# Patient Record
Sex: FEMALE | Race: WHITE | Employment: OTHER | URBAN - METROPOLITAN AREA
[De-identification: names, ages, dates, MRNs, and addresses within clinical notes are randomized per-mention and may not be internally consistent; named-entity substitution may affect disease eponyms.]

---

## 2022-12-05 ENCOUNTER — HOSPITAL ENCOUNTER (INPATIENT)
Age: 72
LOS: 4 days | Discharge: HOME HEALTH | End: 2022-12-09
Attending: STUDENT IN AN ORGANIZED HEALTH CARE EDUCATION/TRAINING PROGRAM | Admitting: STUDENT IN AN ORGANIZED HEALTH CARE EDUCATION/TRAINING PROGRAM
Payer: MEDICARE

## 2022-12-05 DIAGNOSIS — J16.0 COMMUNITY ACQUIRED PNEUMONIA DUE TO CHLAMYDIA SPECIES: Primary | ICD-10-CM

## 2022-12-05 LAB
ADENOVIRUS DETECTION BY PCR: NOT DETECTED
ANION GAP SERPL CALCULATED.3IONS-SCNC: 9 MMOL/L (ref 4–16)
ANISOCYTOSIS: ABNORMAL
APTT: 40.9 SECONDS (ref 25.1–37.1)
BACTERIA: NEGATIVE /HPF
BANDED NEUTROPHILS ABSOLUTE COUNT: 1.73 K/CU MM
BANDED NEUTROPHILS RELATIVE PERCENT: 12 % (ref 5–11)
BASOPHILS ABSOLUTE: 0.1 K/CU MM
BASOPHILS RELATIVE PERCENT: 1 % (ref 0–1)
BILIRUBIN URINE: NEGATIVE MG/DL
BLOOD, URINE: ABNORMAL
BORDETELLA PARAPERTUSSIS BY PCR: NOT DETECTED
BORDETELLA PERTUSSIS PCR: NOT DETECTED
BUN BLDV-MCNC: 18 MG/DL (ref 6–23)
CALCIUM SERPL-MCNC: 9 MG/DL (ref 8.3–10.6)
CHLAMYDOPHILA PNEUMONIA PCR: NOT DETECTED
CHLORIDE BLD-SCNC: 100 MMOL/L (ref 99–110)
CLARITY: CLEAR
CO2: 24 MMOL/L (ref 21–32)
COLOR: YELLOW
CORONAVIRUS 229E PCR: NOT DETECTED
CORONAVIRUS HKU1 PCR: NOT DETECTED
CORONAVIRUS NL63 PCR: NOT DETECTED
CORONAVIRUS OC43 PCR: NOT DETECTED
CREAT SERPL-MCNC: 1 MG/DL (ref 0.6–1.1)
DIFFERENTIAL TYPE: ABNORMAL
EKG ATRIAL RATE: 103 BPM
EKG DIAGNOSIS: NORMAL
EKG P AXIS: 61 DEGREES
EKG P-R INTERVAL: 170 MS
EKG Q-T INTERVAL: 322 MS
EKG QRS DURATION: 74 MS
EKG QTC CALCULATION (BAZETT): 421 MS
EKG R AXIS: -9 DEGREES
EKG T AXIS: 5 DEGREES
EKG VENTRICULAR RATE: 103 BPM
FERRITIN: 48 NG/ML (ref 15–150)
FOLATE: 9.8 NG/ML (ref 3.1–17.5)
GFR SERPL CREATININE-BSD FRML MDRD: 60 ML/MIN/1.73M2
GLUCOSE BLD-MCNC: 88 MG/DL (ref 70–99)
GLUCOSE, URINE: NEGATIVE MG/DL
HCT VFR BLD CALC: 31.7 % (ref 37–47)
HEMOGLOBIN: 9.4 GM/DL (ref 12.5–16)
HUMAN METAPNEUMOVIRUS PCR: NOT DETECTED
HYPOCHROMIA: ABNORMAL
INFLUENZA A BY PCR: NOT DETECTED
INFLUENZA A H1 (2009) PCR: NOT DETECTED
INFLUENZA A H1 PANDEMIC PCR: NOT DETECTED
INFLUENZA A H3 PCR: NOT DETECTED
INFLUENZA B BY PCR: NOT DETECTED
IRON: 14 UG/DL (ref 37–145)
KETONES, URINE: NEGATIVE MG/DL
LACTATE: 1.3 MMOL/L (ref 0.4–2)
LEGIONELLA URINARY AG: NEGATIVE
LEUKOCYTE ESTERASE, URINE: NEGATIVE
LYMPHOCYTES ABSOLUTE: 0.7 K/CU MM
LYMPHOCYTES RELATIVE PERCENT: 5 % (ref 24–44)
MCH RBC QN AUTO: 25.3 PG (ref 27–31)
MCHC RBC AUTO-ENTMCNC: 29.7 % (ref 32–36)
MCV RBC AUTO: 85.2 FL (ref 78–100)
MONOCYTES ABSOLUTE: 0.3 K/CU MM
MONOCYTES RELATIVE PERCENT: 2 % (ref 0–4)
MUCUS: ABNORMAL HPF
MYCOPLASMA PNEUMONIAE PCR: NOT DETECTED
NITRITE URINE, QUANTITATIVE: NEGATIVE
OVALOCYTES: ABNORMAL
PARAINFLUENZA 1 PCR: NOT DETECTED
PARAINFLUENZA 2 PCR: NOT DETECTED
PARAINFLUENZA 3 PCR: ABNORMAL
PARAINFLUENZA 4 PCR: NOT DETECTED
PCT TRANSFERRIN: 4 % (ref 10–44)
PDW BLD-RTO: 15.9 % (ref 11.7–14.9)
PH, URINE: 5.5 (ref 5–8)
PLATELET # BLD: 265 K/CU MM (ref 140–440)
PMV BLD AUTO: 10.6 FL (ref 7.5–11.1)
POTASSIUM SERPL-SCNC: 4.4 MMOL/L (ref 3.5–5.1)
PROCALCITONIN: 12.75
PROTEIN UA: ABNORMAL MG/DL
RBC # BLD: 3.72 M/CU MM (ref 4.2–5.4)
RBC URINE: 2 /HPF (ref 0–6)
RETICULOCYTE COUNT PCT: 1.5 % (ref 0.2–2.2)
RHINOVIRUS ENTEROVIRUS PCR: NOT DETECTED
RSV PCR: NOT DETECTED
SARS-COV-2: NOT DETECTED
SEGMENTED NEUTROPHILS ABSOLUTE COUNT: 11.6 K/CU MM
SEGMENTED NEUTROPHILS RELATIVE PERCENT: 80 % (ref 36–66)
SODIUM BLD-SCNC: 133 MMOL/L (ref 135–145)
SPECIFIC GRAVITY UA: 1.02 (ref 1–1.03)
SQUAMOUS EPITHELIAL: <1 /HPF
STOMATOCYTES: ABNORMAL
STREP PNEUMONIAE ANTIGEN: NORMAL
TOTAL IRON BINDING CAPACITY: 333 UG/DL (ref 250–450)
TRICHOMONAS: ABNORMAL /HPF
TROPONIN T: 0.13 NG/ML
UNSATURATED IRON BINDING CAPACITY: 319 UG/DL (ref 110–370)
UROBILINOGEN, URINE: 0.2 MG/DL (ref 0.2–1)
VITAMIN B-12: 764.1 PG/ML (ref 211–911)
WBC # BLD: 14.4 K/CU MM (ref 4–10.5)
WBC UA: 1 /HPF (ref 0–5)

## 2022-12-05 PROCEDURE — 85027 COMPLETE CBC AUTOMATED: CPT

## 2022-12-05 PROCEDURE — C9113 INJ PANTOPRAZOLE SODIUM, VIA: HCPCS | Performed by: STUDENT IN AN ORGANIZED HEALTH CARE EDUCATION/TRAINING PROGRAM

## 2022-12-05 PROCEDURE — 6370000000 HC RX 637 (ALT 250 FOR IP): Performed by: STUDENT IN AN ORGANIZED HEALTH CARE EDUCATION/TRAINING PROGRAM

## 2022-12-05 PROCEDURE — 87081 CULTURE SCREEN ONLY: CPT

## 2022-12-05 PROCEDURE — 6360000002 HC RX W HCPCS: Performed by: STUDENT IN AN ORGANIZED HEALTH CARE EDUCATION/TRAINING PROGRAM

## 2022-12-05 PROCEDURE — 93005 ELECTROCARDIOGRAM TRACING: CPT | Performed by: STUDENT IN AN ORGANIZED HEALTH CARE EDUCATION/TRAINING PROGRAM

## 2022-12-05 PROCEDURE — 82728 ASSAY OF FERRITIN: CPT

## 2022-12-05 PROCEDURE — 2060000000 HC ICU INTERMEDIATE R&B

## 2022-12-05 PROCEDURE — 36415 COLL VENOUS BLD VENIPUNCTURE: CPT

## 2022-12-05 PROCEDURE — 2700000000 HC OXYGEN THERAPY PER DAY

## 2022-12-05 PROCEDURE — 81001 URINALYSIS AUTO W/SCOPE: CPT

## 2022-12-05 PROCEDURE — 82746 ASSAY OF FOLIC ACID SERUM: CPT

## 2022-12-05 PROCEDURE — 87040 BLOOD CULTURE FOR BACTERIA: CPT

## 2022-12-05 PROCEDURE — 94640 AIRWAY INHALATION TREATMENT: CPT

## 2022-12-05 PROCEDURE — 2580000003 HC RX 258: Performed by: STUDENT IN AN ORGANIZED HEALTH CARE EDUCATION/TRAINING PROGRAM

## 2022-12-05 PROCEDURE — 87449 NOS EACH ORGANISM AG IA: CPT

## 2022-12-05 PROCEDURE — 83540 ASSAY OF IRON: CPT

## 2022-12-05 PROCEDURE — 87899 AGENT NOS ASSAY W/OPTIC: CPT

## 2022-12-05 PROCEDURE — 82803 BLOOD GASES ANY COMBINATION: CPT

## 2022-12-05 PROCEDURE — 84484 ASSAY OF TROPONIN QUANT: CPT

## 2022-12-05 PROCEDURE — 93010 ELECTROCARDIOGRAM REPORT: CPT | Performed by: INTERNAL MEDICINE

## 2022-12-05 PROCEDURE — 94761 N-INVAS EAR/PLS OXIMETRY MLT: CPT

## 2022-12-05 PROCEDURE — 0202U NFCT DS 22 TRGT SARS-COV-2: CPT

## 2022-12-05 PROCEDURE — 83550 IRON BINDING TEST: CPT

## 2022-12-05 PROCEDURE — 84145 PROCALCITONIN (PCT): CPT

## 2022-12-05 PROCEDURE — 83605 ASSAY OF LACTIC ACID: CPT

## 2022-12-05 PROCEDURE — 85007 BL SMEAR W/DIFF WBC COUNT: CPT

## 2022-12-05 PROCEDURE — 94664 DEMO&/EVAL PT USE INHALER: CPT

## 2022-12-05 PROCEDURE — 85730 THROMBOPLASTIN TIME PARTIAL: CPT

## 2022-12-05 PROCEDURE — 80048 BASIC METABOLIC PNL TOTAL CA: CPT

## 2022-12-05 PROCEDURE — 85045 AUTOMATED RETICULOCYTE COUNT: CPT

## 2022-12-05 PROCEDURE — 82607 VITAMIN B-12: CPT

## 2022-12-05 PROCEDURE — 93306 TTE W/DOPPLER COMPLETE: CPT

## 2022-12-05 RX ORDER — SODIUM CHLORIDE 0.9 % (FLUSH) 0.9 %
5-40 SYRINGE (ML) INJECTION EVERY 12 HOURS SCHEDULED
Status: DISCONTINUED | OUTPATIENT
Start: 2022-12-05 | End: 2022-12-09 | Stop reason: HOSPADM

## 2022-12-05 RX ORDER — GUAIFENESIN/DEXTROMETHORPHAN 100-10MG/5
5 SYRUP ORAL EVERY 4 HOURS PRN
Status: DISCONTINUED | OUTPATIENT
Start: 2022-12-05 | End: 2022-12-09 | Stop reason: HOSPADM

## 2022-12-05 RX ORDER — IPRATROPIUM BROMIDE AND ALBUTEROL SULFATE 2.5; .5 MG/3ML; MG/3ML
1 SOLUTION RESPIRATORY (INHALATION) ONCE
Status: COMPLETED | OUTPATIENT
Start: 2022-12-05 | End: 2022-12-05

## 2022-12-05 RX ORDER — ALBUTEROL SULFATE 90 UG/1
2 AEROSOL, METERED RESPIRATORY (INHALATION)
Status: DISCONTINUED | OUTPATIENT
Start: 2022-12-05 | End: 2022-12-07

## 2022-12-05 RX ORDER — ALBUTEROL SULFATE 90 UG/1
2 AEROSOL, METERED RESPIRATORY (INHALATION) ONCE
Status: COMPLETED | OUTPATIENT
Start: 2022-12-05 | End: 2022-12-05

## 2022-12-05 RX ORDER — BENZONATATE 100 MG/1
100 CAPSULE ORAL 3 TIMES DAILY PRN
Status: DISCONTINUED | OUTPATIENT
Start: 2022-12-05 | End: 2022-12-09 | Stop reason: HOSPADM

## 2022-12-05 RX ORDER — ACETAMINOPHEN 325 MG/1
650 TABLET ORAL EVERY 6 HOURS PRN
Status: DISCONTINUED | OUTPATIENT
Start: 2022-12-05 | End: 2022-12-09 | Stop reason: HOSPADM

## 2022-12-05 RX ORDER — ONDANSETRON 4 MG/1
4 TABLET, ORALLY DISINTEGRATING ORAL EVERY 8 HOURS PRN
Status: DISCONTINUED | OUTPATIENT
Start: 2022-12-05 | End: 2022-12-09 | Stop reason: HOSPADM

## 2022-12-05 RX ORDER — SODIUM CHLORIDE 9 MG/ML
INJECTION, SOLUTION INTRAVENOUS CONTINUOUS
Status: DISCONTINUED | OUTPATIENT
Start: 2022-12-05 | End: 2022-12-08

## 2022-12-05 RX ORDER — SODIUM CHLORIDE 9 MG/ML
INJECTION, SOLUTION INTRAVENOUS PRN
Status: DISCONTINUED | OUTPATIENT
Start: 2022-12-05 | End: 2022-12-09 | Stop reason: HOSPADM

## 2022-12-05 RX ORDER — SODIUM CHLORIDE 0.9 % (FLUSH) 0.9 %
5-40 SYRINGE (ML) INJECTION PRN
Status: DISCONTINUED | OUTPATIENT
Start: 2022-12-05 | End: 2022-12-09 | Stop reason: HOSPADM

## 2022-12-05 RX ORDER — ACETAMINOPHEN 650 MG/1
650 SUPPOSITORY RECTAL EVERY 6 HOURS PRN
Status: DISCONTINUED | OUTPATIENT
Start: 2022-12-05 | End: 2022-12-09 | Stop reason: HOSPADM

## 2022-12-05 RX ORDER — PANTOPRAZOLE SODIUM 40 MG/10ML
40 INJECTION, POWDER, LYOPHILIZED, FOR SOLUTION INTRAVENOUS DAILY
Status: DISCONTINUED | OUTPATIENT
Start: 2022-12-05 | End: 2022-12-05 | Stop reason: CLARIF

## 2022-12-05 RX ORDER — IPRATROPIUM BROMIDE AND ALBUTEROL SULFATE 2.5; .5 MG/3ML; MG/3ML
1 SOLUTION RESPIRATORY (INHALATION)
Status: DISCONTINUED | OUTPATIENT
Start: 2022-12-05 | End: 2022-12-05

## 2022-12-05 RX ORDER — ENOXAPARIN SODIUM 100 MG/ML
40 INJECTION SUBCUTANEOUS EVERY EVENING
Status: DISCONTINUED | OUTPATIENT
Start: 2022-12-06 | End: 2022-12-09 | Stop reason: HOSPADM

## 2022-12-05 RX ORDER — SODIUM CHLORIDE, SODIUM LACTATE, POTASSIUM CHLORIDE, CALCIUM CHLORIDE 600; 310; 30; 20 MG/100ML; MG/100ML; MG/100ML; MG/100ML
INJECTION, SOLUTION INTRAVENOUS CONTINUOUS
Status: DISCONTINUED | OUTPATIENT
Start: 2022-12-05 | End: 2022-12-05

## 2022-12-05 RX ORDER — ONDANSETRON 2 MG/ML
4 INJECTION INTRAMUSCULAR; INTRAVENOUS EVERY 6 HOURS PRN
Status: DISCONTINUED | OUTPATIENT
Start: 2022-12-05 | End: 2022-12-09 | Stop reason: HOSPADM

## 2022-12-05 RX ORDER — GUAIFENESIN 600 MG/1
600 TABLET, EXTENDED RELEASE ORAL 2 TIMES DAILY
Status: DISCONTINUED | OUTPATIENT
Start: 2022-12-05 | End: 2022-12-09 | Stop reason: HOSPADM

## 2022-12-05 RX ORDER — LEVOFLOXACIN 5 MG/ML
750 INJECTION, SOLUTION INTRAVENOUS
Status: DISCONTINUED | OUTPATIENT
Start: 2022-12-06 | End: 2022-12-06

## 2022-12-05 RX ORDER — ASPIRIN 81 MG/1
81 TABLET, CHEWABLE ORAL DAILY
Status: DISCONTINUED | OUTPATIENT
Start: 2022-12-05 | End: 2022-12-09 | Stop reason: HOSPADM

## 2022-12-05 RX ADMIN — ALBUTEROL SULFATE 2 PUFF: 90 AEROSOL, METERED RESPIRATORY (INHALATION) at 11:59

## 2022-12-05 RX ADMIN — Medication 2 PUFF: at 19:46

## 2022-12-05 RX ADMIN — PIPERACILLIN AND TAZOBACTAM 3375 MG: 3; .375 INJECTION, POWDER, LYOPHILIZED, FOR SOLUTION INTRAVENOUS at 16:42

## 2022-12-05 RX ADMIN — GUAIFENESIN 600 MG: 600 TABLET, EXTENDED RELEASE ORAL at 08:47

## 2022-12-05 RX ADMIN — PIPERACILLIN AND TAZOBACTAM 3375 MG: 3; .375 INJECTION, POWDER, LYOPHILIZED, FOR SOLUTION INTRAVENOUS at 09:30

## 2022-12-05 RX ADMIN — ALBUTEROL SULFATE 2 PUFF: 90 AEROSOL, METERED RESPIRATORY (INHALATION) at 07:56

## 2022-12-05 RX ADMIN — SODIUM CHLORIDE, PRESERVATIVE FREE 40 MG: 5 INJECTION INTRAVENOUS at 08:48

## 2022-12-05 RX ADMIN — SODIUM CHLORIDE, PRESERVATIVE FREE 10 ML: 5 INJECTION INTRAVENOUS at 21:05

## 2022-12-05 RX ADMIN — Medication 2 PUFF: at 07:58

## 2022-12-05 RX ADMIN — SODIUM CHLORIDE: 9 INJECTION, SOLUTION INTRAVENOUS at 09:30

## 2022-12-05 RX ADMIN — ALBUTEROL SULFATE 2 PUFF: 90 AEROSOL, METERED RESPIRATORY (INHALATION) at 19:46

## 2022-12-05 RX ADMIN — ASPIRIN 81 MG: 81 TABLET, CHEWABLE ORAL at 08:47

## 2022-12-05 RX ADMIN — ACETAMINOPHEN 650 MG: 325 TABLET ORAL at 08:48

## 2022-12-05 RX ADMIN — GUAIFENESIN AND DEXTROMETHORPHAN 5 ML: 100; 10 SYRUP ORAL at 16:42

## 2022-12-05 RX ADMIN — GUAIFENESIN 600 MG: 600 TABLET, EXTENDED RELEASE ORAL at 21:05

## 2022-12-05 RX ADMIN — ACETAMINOPHEN 650 MG: 325 TABLET ORAL at 16:42

## 2022-12-05 RX ADMIN — Medication 2 PUFF: at 12:01

## 2022-12-05 RX ADMIN — IPRATROPIUM BROMIDE AND ALBUTEROL SULFATE 1 AMPULE: .5; 2.5 SOLUTION RESPIRATORY (INHALATION) at 08:19

## 2022-12-05 ASSESSMENT — PAIN DESCRIPTION - DESCRIPTORS: DESCRIPTORS: ACHING

## 2022-12-05 ASSESSMENT — PAIN DESCRIPTION - LOCATION: LOCATION: HEAD

## 2022-12-05 NOTE — PROGRESS NOTES
Upon completion of MDI treatments, patient had an increased work of breathing, with wheezing with moderated retractions. This RT notified [place]. This RT gave patient a Duoneb . Patient is still wheezing, retractions have improved. Patient placed on 6 LPM HFNC.

## 2022-12-05 NOTE — H&P
History and Physical      Name:  Nneka Ortiz /Age/Sex: 1950  (67 y.o. female)   MRN & CSN:  [id number] & [id number] Encounter Date/Time: 2022 6:03 AM EST   Location:  -A PCP: No primary care provider on file. Hospital Day: 1    Assessment and Plan:     Patient is a 68-year-old female who presented with SOB x5 days. # Sepsis and hypoxemic respiratory failure secondary to community-acquired pneumonia  - Presented with worsening SOB and productive cough of brown sputum x5 days. Also endorsed subjective fevers, nausea and NBNB vomiting. No travels or sick contacts. - At outside ED, had SpO2 in low 80's on arrival, required NRB. Febrile at 103 °F, labs showed leukocytosis of 17.5. LA 2.0. CXR showing left basilar consolidation. CTPE negative, but showed infiltrates in LLL and RML. Flu and COVID negative. - Received IVF and Levaquin in the ED, continue.  - Follow-up cultures and inflammatory markers. # NSTEMI, type II  - Denied any CP, but endorsed 4-pillow orthopnea orthopnea and HERNANDEZ. No cardiac hx. - At outside ED, initial Tn 0.123 then 0.374 (normal <0.013). ECG without acute ischemic changes. .  - Follow-up repeat Tn, consider Cardiology consult if continues to rise. TTE ordered. Telemetry. # Acute on chronic anemia  - Denied any bleeding. No NSAIDs.  - Hg 9.3 in the ED, down from 14 in 2022.   - Follow-up anemia panel. Started on PPI. # Essential hypertension  - Held home Norvasc 10 mg and Chlorthalidone 25 mg in setting of sepsis. Checklist:  Advanced directive: DNR/DNI and no CV//AA  Antibiotics: as above  Catheter: none  DVT ppx: Lovenox  Nutrition/Diet: cardiac    Disposition: patient requires continued admission due to sepsis secondary to community-acquired pneumonia. MDM: moderate.     History of Present Illness:     Chief Complaint: shortness of breath    Patient is a 68-year-old female with a PMHx of HTN who presented to the ED with BILITOT, ALKPHOS, AST, ALT in the last 72 hours. Lipids: No results found for: CHOL, HDL, TRIG  Hemoglobin A1C: No results found for: LABA1C  TSH: No results found for: TSH  Troponin: No results found for: TROPONINT  BNP: No results for input(s): PROBNP in the last 72 hours. Lactic Acid: No results for input(s): LACTA in the last 72 hours.   UA:No results found for: Selina Reg, PHUR, LABCAST, WBCUA, RBCUA, MUCUS, TRICHOMONAS, YEAST, BACTERIA, CLARITYU, SPECGRAV, LEUKOCYTESUR, UROBILINOGEN, BILIRUBINUR, BLOODU, GLUCOSEU, KETUA, AMORPHOUS  Urine Cultures: No results found for: LABURIN  Blood Cultures: No results found for: BC  No results found for: BLOODCULT2  Organism: No results found for: [facility]    Radiology results:  No orders to display       Medications:     Medications:    levofloxacin  750 mg IntraVENous Q24H    pantoprazole  40 mg IntraVENous Daily    aspirin  81 mg Oral Daily    sodium chloride flush  5-40 mL IntraVENous 2 times per day    [START ON 12/6/2022] enoxaparin  40 mg SubCUTAneous QPM    ipratropium-albuterol  1 ampule Inhalation Q4H WA    guaiFENesin  600 mg Oral BID        Infusions:    sodium chloride         PRN Meds:   sodium chloride flush, 5-40 mL, PRN  sodium chloride, , PRN  ondansetron, 4 mg, Q8H PRN   Or  ondansetron, 4 mg, Q6H PRN  acetaminophen, 650 mg, Q6H PRN   Or  acetaminophen, 650 mg, Q6H PRN      Estelita Hahn MD  12/05/22 6:03 AM

## 2022-12-06 LAB
ANION GAP SERPL CALCULATED.3IONS-SCNC: 10 MMOL/L (ref 4–16)
BASOPHILS ABSOLUTE: 0 K/CU MM
BASOPHILS RELATIVE PERCENT: 0.2 % (ref 0–1)
BUN BLDV-MCNC: 15 MG/DL (ref 6–23)
CALCIUM SERPL-MCNC: 8.6 MG/DL (ref 8.3–10.6)
CHLORIDE BLD-SCNC: 106 MMOL/L (ref 99–110)
CO2: 23 MMOL/L (ref 21–32)
CREAT SERPL-MCNC: 0.8 MG/DL (ref 0.6–1.1)
DIFFERENTIAL TYPE: ABNORMAL
EOSINOPHILS ABSOLUTE: 0 K/CU MM
EOSINOPHILS RELATIVE PERCENT: 0 % (ref 0–3)
GFR SERPL CREATININE-BSD FRML MDRD: >60 ML/MIN/1.73M2
GLUCOSE BLD-MCNC: 96 MG/DL (ref 70–99)
HCT VFR BLD CALC: 28.7 % (ref 37–47)
HEMOGLOBIN: 8.4 GM/DL (ref 12.5–16)
IMMATURE NEUTROPHIL %: 1 % (ref 0–0.43)
LYMPHOCYTES ABSOLUTE: 0.7 K/CU MM
LYMPHOCYTES RELATIVE PERCENT: 7.4 % (ref 24–44)
MCH RBC QN AUTO: 24.9 PG (ref 27–31)
MCHC RBC AUTO-ENTMCNC: 29.3 % (ref 32–36)
MCV RBC AUTO: 85.2 FL (ref 78–100)
MONOCYTES ABSOLUTE: 0.3 K/CU MM
MONOCYTES RELATIVE PERCENT: 2.9 % (ref 0–4)
NUCLEATED RBC %: 0.2 %
PDW BLD-RTO: 15.9 % (ref 11.7–14.9)
PLATELET # BLD: 213 K/CU MM (ref 140–440)
PMV BLD AUTO: 10.5 FL (ref 7.5–11.1)
POTASSIUM SERPL-SCNC: 4.2 MMOL/L (ref 3.5–5.1)
RBC # BLD: 3.37 M/CU MM (ref 4.2–5.4)
SEGMENTED NEUTROPHILS ABSOLUTE COUNT: 8.5 K/CU MM
SEGMENTED NEUTROPHILS RELATIVE PERCENT: 88.5 % (ref 36–66)
SODIUM BLD-SCNC: 139 MMOL/L (ref 135–145)
TOTAL IMMATURE NEUTOROPHIL: 0.1 K/CU MM
TOTAL NUCLEATED RBC: 0 K/CU MM
WBC # BLD: 9.6 K/CU MM (ref 4–10.5)

## 2022-12-06 PROCEDURE — 2060000000 HC ICU INTERMEDIATE R&B

## 2022-12-06 PROCEDURE — 6370000000 HC RX 637 (ALT 250 FOR IP): Performed by: STUDENT IN AN ORGANIZED HEALTH CARE EDUCATION/TRAINING PROGRAM

## 2022-12-06 PROCEDURE — 2580000003 HC RX 258: Performed by: STUDENT IN AN ORGANIZED HEALTH CARE EDUCATION/TRAINING PROGRAM

## 2022-12-06 PROCEDURE — 94761 N-INVAS EAR/PLS OXIMETRY MLT: CPT

## 2022-12-06 PROCEDURE — C9113 INJ PANTOPRAZOLE SODIUM, VIA: HCPCS | Performed by: STUDENT IN AN ORGANIZED HEALTH CARE EDUCATION/TRAINING PROGRAM

## 2022-12-06 PROCEDURE — 80048 BASIC METABOLIC PNL TOTAL CA: CPT

## 2022-12-06 PROCEDURE — 6360000002 HC RX W HCPCS: Performed by: STUDENT IN AN ORGANIZED HEALTH CARE EDUCATION/TRAINING PROGRAM

## 2022-12-06 PROCEDURE — 85025 COMPLETE CBC W/AUTO DIFF WBC: CPT

## 2022-12-06 PROCEDURE — 2700000000 HC OXYGEN THERAPY PER DAY

## 2022-12-06 PROCEDURE — 94640 AIRWAY INHALATION TREATMENT: CPT

## 2022-12-06 PROCEDURE — 36415 COLL VENOUS BLD VENIPUNCTURE: CPT

## 2022-12-06 RX ADMIN — ALBUTEROL SULFATE 2 PUFF: 90 AEROSOL, METERED RESPIRATORY (INHALATION) at 12:01

## 2022-12-06 RX ADMIN — Medication 2 PUFF: at 15:42

## 2022-12-06 RX ADMIN — BENZONATATE 100 MG: 100 CAPSULE ORAL at 08:41

## 2022-12-06 RX ADMIN — GUAIFENESIN 600 MG: 600 TABLET, EXTENDED RELEASE ORAL at 20:51

## 2022-12-06 RX ADMIN — ASPIRIN 81 MG: 81 TABLET, CHEWABLE ORAL at 08:41

## 2022-12-06 RX ADMIN — GUAIFENESIN AND DEXTROMETHORPHAN 5 ML: 100; 10 SYRUP ORAL at 13:41

## 2022-12-06 RX ADMIN — CEFTRIAXONE SODIUM 1000 MG: 1 INJECTION, POWDER, FOR SOLUTION INTRAMUSCULAR; INTRAVENOUS at 13:38

## 2022-12-06 RX ADMIN — DEXTROSE MONOHYDRATE 500 MG: 50 INJECTION, SOLUTION INTRAVENOUS at 13:38

## 2022-12-06 RX ADMIN — SODIUM CHLORIDE, PRESERVATIVE FREE 10 ML: 5 INJECTION INTRAVENOUS at 20:51

## 2022-12-06 RX ADMIN — SODIUM CHLORIDE, PRESERVATIVE FREE 40 MG: 5 INJECTION INTRAVENOUS at 08:42

## 2022-12-06 RX ADMIN — ALBUTEROL SULFATE 2 PUFF: 90 AEROSOL, METERED RESPIRATORY (INHALATION) at 23:53

## 2022-12-06 RX ADMIN — PIPERACILLIN AND TAZOBACTAM 3375 MG: 3; .375 INJECTION, POWDER, LYOPHILIZED, FOR SOLUTION INTRAVENOUS at 02:51

## 2022-12-06 RX ADMIN — ALBUTEROL SULFATE 2 PUFF: 90 AEROSOL, METERED RESPIRATORY (INHALATION) at 08:07

## 2022-12-06 RX ADMIN — PIPERACILLIN AND TAZOBACTAM 3375 MG: 3; .375 INJECTION, POWDER, LYOPHILIZED, FOR SOLUTION INTRAVENOUS at 08:43

## 2022-12-06 RX ADMIN — GUAIFENESIN AND DEXTROMETHORPHAN 5 ML: 100; 10 SYRUP ORAL at 02:48

## 2022-12-06 RX ADMIN — GUAIFENESIN 600 MG: 600 TABLET, EXTENDED RELEASE ORAL at 08:41

## 2022-12-06 RX ADMIN — Medication 2 PUFF: at 03:09

## 2022-12-06 RX ADMIN — Medication 2 PUFF: at 23:54

## 2022-12-06 RX ADMIN — Medication 2 PUFF: at 08:07

## 2022-12-06 RX ADMIN — ENOXAPARIN SODIUM 40 MG: 100 INJECTION SUBCUTANEOUS at 17:56

## 2022-12-06 RX ADMIN — Medication 2 PUFF: at 12:01

## 2022-12-06 RX ADMIN — ALBUTEROL SULFATE 2 PUFF: 90 AEROSOL, METERED RESPIRATORY (INHALATION) at 19:58

## 2022-12-06 RX ADMIN — ALBUTEROL SULFATE 2 PUFF: 90 AEROSOL, METERED RESPIRATORY (INHALATION) at 15:42

## 2022-12-06 RX ADMIN — ALBUTEROL SULFATE 2 PUFF: 90 AEROSOL, METERED RESPIRATORY (INHALATION) at 03:09

## 2022-12-06 RX ADMIN — SODIUM CHLORIDE: 9 INJECTION, SOLUTION INTRAVENOUS at 17:57

## 2022-12-06 RX ADMIN — Medication 2 PUFF: at 19:58

## 2022-12-06 RX ADMIN — SODIUM CHLORIDE: 9 INJECTION, SOLUTION INTRAVENOUS at 07:25

## 2022-12-06 RX ADMIN — ACETAMINOPHEN 650 MG: 325 TABLET ORAL at 08:41

## 2022-12-06 ASSESSMENT — ENCOUNTER SYMPTOMS
BACK PAIN: 0
EYE PAIN: 0
EYE DISCHARGE: 0
CHEST TIGHTNESS: 0
VOMITING: 0
COUGH: 1
VOICE CHANGE: 0
NAUSEA: 0
SHORTNESS OF BREATH: 1
SINUS PRESSURE: 0
BLOOD IN STOOL: 0
SINUS PAIN: 0
ABDOMINAL PAIN: 0
DIARRHEA: 0

## 2022-12-06 NOTE — PROGRESS NOTES
V2.0  [facility] [place]      Name:  Estefany Iverson /Age/Sex: 1950  (67 y.o. female)   MRN & CSN:  [id number] & [id number] Encounter Date/Time: 2022 10:15 AM EST    Location:  -A PCP: No primary care provider on file. Hospital Day: 2    Assessment and Plan:   Estefany Iverson is a 67 y.o. female with pmh of anemia, hypertension who presents with Community acquired pneumonia due to Chlamydia species      Plan:  Severe sepsis and acute hypoxic respiratory failure in the setting of parainfluenza infection: Presented with shortness of breath productive cough for 5 days. Was febrile at 103 °F.  Lactic acid level was 2.0. Chest x-ray showed left basilar consolidation. CT PE was negative. COVID test was negative. Positive for parainfluenza. On Levaquin. Change Levaquin to ceftriaxone and azithromycin for atypical coverage. De-escalate oxygen requirements. Currently on 2 L nasal cannula. White count is improving. Elevated troponins rule out ACS? Cardiology on board. Echo ordered. Telemetry ordered. Troponin initial 0.123 then 0.374 raising concern for NSTEMI. On telemetry  Acute on chronic anemia with hemoglobin 9.3 in the ED. 14 in 2022. Anemia panel was ordered. On Protonix  Essential hypertension    Diet ADULT DIET; Regular; 5 carb choices (75 gm/meal); Low Fat/Low Chol/High Fiber/QUINCY; Low Sodium (2 gm)  ADULT ORAL NUTRITION SUPPLEMENT; Breakfast, Lunch, Dinner; Standard High Calorie/High Protein Oral Supplement   DVT Prophylaxis [] Lovenox, []  Heparin, [] SCDs, [] Ambulation,  [] Eliquis, [] Xarelto  [] Coumadin   Code Status Limited   Disposition From: Home  Expected Disposition: Home  Estimated Date of Discharge: 2 to 3 days  Patient requires continued admission due to still on oxygen, feels weak. Recent diagnosis of parainfluenza   Surrogate Decision Maker/ POA      Subjective:     Chief Complaint: No chief complaint on file.      The patient was seen at the bedside. No acute events . Tolerating diet. Discussed in detail about antibiotics and antibiotic needed to be changed. Return to cover atypical organism patient agreed. On 2 L renal.         Review of Systems:    Review of Systems   Constitutional:  Negative for appetite change, chills, fever and unexpected weight change. HENT:  Negative for ear pain, hearing loss, sinus pressure, sinus pain and voice change. Eyes:  Negative for pain, discharge and visual disturbance. Respiratory:  Positive for cough and shortness of breath. Negative for chest tightness. Cardiovascular:  Negative for chest pain, palpitations and leg swelling. Gastrointestinal:  Negative for abdominal pain, blood in stool, diarrhea, nausea and vomiting. Genitourinary:  Negative for dysuria and frequency. Musculoskeletal:  Positive for arthralgias and myalgias. Negative for back pain. Neurological:  Positive for weakness. Negative for dizziness, light-headedness and headaches. Psychiatric/Behavioral:  Negative for sleep disturbance. Objective: Intake/Output Summary (Last 24 hours) at 12/6/2022 1015  Last data filed at 12/5/2022 1647  Gross per 24 hour   Intake --   Output 450 ml   Net -450 ml        Vitals:   Vitals:    12/06/22 0830   BP: (!) 152/71   Pulse: 100   Resp: (!) 31   Temp: 100.1 °F (37.8 °C)   SpO2: 100%       Physical Exam:   Physical Exam  Vitals reviewed. Constitutional:       Appearance: Normal appearance. She is normal weight. HENT:      Head: Normocephalic. Nose: Nose normal.      Mouth/Throat:      Mouth: Mucous membranes are moist.   Eyes:      Conjunctiva/sclera: Conjunctivae normal.      Pupils: Pupils are equal, round, and reactive to light. Cardiovascular:      Rate and Rhythm: Normal rate and regular rhythm. Pulses: Normal pulses. Heart sounds: Normal heart sounds. No murmur heard.   Pulmonary:      Effort: Pulmonary effort is normal.      Breath sounds: Rales present. No wheezing or rhonchi. Abdominal:      General: Abdomen is flat. Bowel sounds are normal. There is no distension. Palpations: Abdomen is soft. Tenderness: There is no abdominal tenderness. Musculoskeletal:         General: No deformity. Normal range of motion. Cervical back: Normal range of motion and neck supple. Right lower leg: No edema. Left lower leg: No edema. Skin:     Coloration: Skin is not jaundiced or pale. Neurological:      General: No focal deficit present. Mental Status: She is alert and oriented to person, place, and time. Mental status is at baseline. Motor: Weakness present.           Medications:   Medications:    levofloxacin  750 mg IntraVENous Q48H    aspirin  81 mg Oral Daily    sodium chloride flush  5-40 mL IntraVENous 2 times per day    enoxaparin  40 mg SubCUTAneous QPM    guaiFENesin  600 mg Oral BID    pantoprazole (PROTONIX) 40 mg injection  40 mg IntraVENous Daily    albuterol sulfate HFA  2 puff Inhalation Q4H WA    ipratropium  2 puff Inhalation Q4H WA    piperacillin-tazobactam  3,375 mg IntraVENous Q8H      Infusions:    sodium chloride      sodium chloride 100 mL/hr at 12/06/22 0725     PRN Meds: sodium chloride flush, 5-40 mL, PRN  sodium chloride, , PRN  ondansetron, 4 mg, Q8H PRN   Or  ondansetron, 4 mg, Q6H PRN  acetaminophen, 650 mg, Q6H PRN   Or  acetaminophen, 650 mg, Q6H PRN  guaiFENesin-dextromethorphan, 5 mL, Q4H PRN  benzonatate, 100 mg, TID PRN        Labs      Recent Results (from the past 24 hour(s))   Basic Metabolic Panel w/ Reflex to MG    Collection Time: 12/06/22  3:41 AM   Result Value Ref Range    Sodium 139 135 - 145 MMOL/L    Potassium 4.2 3.5 - 5.1 MMOL/L    Chloride 106 99 - 110 mMol/L    CO2 23 21 - 32 MMOL/L    Anion Gap 10 4 - 16    BUN 15 6 - 23 MG/DL    Creatinine 0.8 0.6 - 1.1 MG/DL    Est, Glom Filt Rate >60 >60 mL/min/1.73m2    Glucose 96 70 - 99 MG/DL    Calcium 8.6 8.3 - 10.6 MG/DL   CBC with Auto Differential    Collection Time: 12/06/22  3:41 AM   Result Value Ref Range    WBC 9.6 4.0 - 10.5 K/CU MM    RBC 3.37 (L) 4.2 - 5.4 M/CU MM    Hemoglobin 8.4 (L) 12.5 - 16.0 GM/DL    Hematocrit 28.7 (L) 37 - 47 %    MCV 85.2 78 - 100 FL    MCH 24.9 (L) 27 - 31 PG    MCHC 29.3 (L) 32.0 - 36.0 %    RDW 15.9 (H) 11.7 - 14.9 %    Platelets 482 749 - 750 K/CU MM    MPV 10.5 7.5 - 11.1 FL    Differential Type AUTOMATED DIFFERENTIAL     Segs Relative 88.5 (H) 36 - 66 %    Lymphocytes % 7.4 (L) 24 - 44 %    Monocytes % 2.9 0 - 4 %    Eosinophils % 0.0 0 - 3 %    Basophils % 0.2 0 - 1 %    Segs Absolute 8.5 K/CU MM    Lymphocytes Absolute 0.7 K/CU MM    Monocytes Absolute 0.3 K/CU MM    Eosinophils Absolute 0.0 K/CU MM    Basophils Absolute 0.0 K/CU MM    Nucleated RBC % 0.2 %    Total Nucleated RBC 0.0 K/CU MM    Total Immature Neutrophil 0.10 K/CU MM    Immature Neutrophil % 1.0 (H) 0 - 0.43 %        Imaging/Diagnostics Last 24 Hours   No results found.     Electronically signed by Delmi Scott MD, MD on 12/6/2022 at 10:15 AM

## 2022-12-06 NOTE — PROGRESS NOTES
Comprehensive Nutrition Assessment    Type and Reason for Visit:  Initial, Positive Nutrition Screen (wt loss, poor intake, Oriental orthodox)    Nutrition Recommendations/Plan:   Liberalize diet to Regular  Continue oral supplements, between meals     Malnutrition Assessment:  Malnutrition Status:  Insufficient data (12/06/22 1202)    Context:  Acute Illness       Nutrition Assessment:    Pt admitted with community acquired pneumonia due to Chlamydia species. H/O anemia, hypertension. She reports wt loss and poor oral intake PTA. No recent wt loss noted per Care Everywhere. She is currently ordered an overly restricted diet, will liberalize. Continue current standard supplement tid. Pt is sleeping during my room visit. Will continue to follow as high nutrition risk pending improvement in po intake. Nutrition Related Findings:    H/H 8.4/28.7, Glu 96   Wound Type: None       Current Nutrition Intake & Therapies:    Average Meal Intake: Unable to assess  Average Supplements Intake: Unable to assess  ADULT DIET; Regular; 5 carb choices (75 gm/meal); Low Fat/Low Chol/High Fiber/QUINCY; Low Sodium (2 gm)  ADULT ORAL NUTRITION SUPPLEMENT; Breakfast, Lunch, Dinner; Standard High Calorie/High Protein Oral Supplement    Anthropometric Measures:  Height: 5' 1\" (154.9 cm)  Ideal Body Weight (IBW): 105 lbs (48 kg)    Admission Body Weight: 179 lb 3.7 oz (81.3 kg)  Current Body Weight: 179 lb 3.7 oz (81.3 kg), 170.7 % IBW. Current BMI (kg/m2): 33.9  Usual Body Weight: 161 lb (73 kg) (11/1/22)  % Weight Change (Calculated): 11.3                    BMI Categories: Obese Class 1 (BMI 30.0-34. 9)    Estimated Daily Nutrient Needs:  Energy Requirements Based On: Formula  Weight Used for Energy Requirements: Current  Energy (kcal/day): 1513 (MSJ)  Weight Used for Protein Requirements: Ideal  Protein (g/day): 48-57 (1-1.2 g/kg IBW)  Method Used for Fluid Requirements: 1 ml/kcal  Fluid (ml/day): 1500    Nutrition Diagnosis:   Predicted inadequate energy intake related to impaired respiratory function as evidenced by poor intake prior to admission    Nutrition Interventions:   Food and/or Nutrient Delivery: Modify Current Diet, Continue Oral Nutrition Supplement  Nutrition Education/Counseling: No recommendation at this time  Coordination of Nutrition Care: Continue to monitor while inpatient, Feeding Assistance/Environment Change       Goals:     Goals: Meet at least 75% of estimated needs       Nutrition Monitoring and Evaluation:   Behavioral-Environmental Outcomes: None Identified  Food/Nutrient Intake Outcomes: Food and Nutrient Intake, Supplement Intake  Physical Signs/Symptoms Outcomes: Biochemical Data, GI Status, Nausea or Vomiting, Fluid Status or Edema, Meal Time Behavior, Weight    Discharge Planning:     Too soon to determine     Dhaval Drew, [address] [place] [address], LD  Contact: [phone]

## 2022-12-07 ENCOUNTER — APPOINTMENT (OUTPATIENT)
Dept: NUCLEAR MEDICINE | Age: 72
End: 2022-12-07
Attending: STUDENT IN AN ORGANIZED HEALTH CARE EDUCATION/TRAINING PROGRAM
Payer: MEDICARE

## 2022-12-07 PROBLEM — I21.4 NSTEMI (NON-ST ELEVATED MYOCARDIAL INFARCTION) (HCC): Status: ACTIVE | Noted: 2022-12-07

## 2022-12-07 LAB
CULTURE: NORMAL
Lab: NORMAL
SPECIMEN: NORMAL

## 2022-12-07 PROCEDURE — 6360000002 HC RX W HCPCS: Performed by: STUDENT IN AN ORGANIZED HEALTH CARE EDUCATION/TRAINING PROGRAM

## 2022-12-07 PROCEDURE — 78452 HT MUSCLE IMAGE SPECT MULT: CPT

## 2022-12-07 PROCEDURE — 99222 1ST HOSP IP/OBS MODERATE 55: CPT | Performed by: INTERNAL MEDICINE

## 2022-12-07 PROCEDURE — 94761 N-INVAS EAR/PLS OXIMETRY MLT: CPT

## 2022-12-07 PROCEDURE — 2580000003 HC RX 258: Performed by: STUDENT IN AN ORGANIZED HEALTH CARE EDUCATION/TRAINING PROGRAM

## 2022-12-07 PROCEDURE — 6370000000 HC RX 637 (ALT 250 FOR IP): Performed by: STUDENT IN AN ORGANIZED HEALTH CARE EDUCATION/TRAINING PROGRAM

## 2022-12-07 PROCEDURE — 2700000000 HC OXYGEN THERAPY PER DAY

## 2022-12-07 PROCEDURE — 3430000000 HC RX DIAGNOSTIC RADIOPHARMACEUTICAL: Performed by: INTERNAL MEDICINE

## 2022-12-07 PROCEDURE — A4216 STERILE WATER/SALINE, 10 ML: HCPCS | Performed by: STUDENT IN AN ORGANIZED HEALTH CARE EDUCATION/TRAINING PROGRAM

## 2022-12-07 PROCEDURE — 6360000002 HC RX W HCPCS: Performed by: INTERNAL MEDICINE

## 2022-12-07 PROCEDURE — 2060000000 HC ICU INTERMEDIATE R&B

## 2022-12-07 PROCEDURE — 94640 AIRWAY INHALATION TREATMENT: CPT

## 2022-12-07 PROCEDURE — C9113 INJ PANTOPRAZOLE SODIUM, VIA: HCPCS | Performed by: STUDENT IN AN ORGANIZED HEALTH CARE EDUCATION/TRAINING PROGRAM

## 2022-12-07 PROCEDURE — A9500 TC99M SESTAMIBI: HCPCS | Performed by: INTERNAL MEDICINE

## 2022-12-07 PROCEDURE — 93017 CV STRESS TEST TRACING ONLY: CPT

## 2022-12-07 RX ORDER — AMINOPHYLLINE DIHYDRATE 25 MG/ML
500 INJECTION, SOLUTION INTRAVENOUS ONCE
Status: COMPLETED | OUTPATIENT
Start: 2022-12-07 | End: 2022-12-07

## 2022-12-07 RX ORDER — ALBUTEROL SULFATE 2.5 MG/3ML
2.5 SOLUTION RESPIRATORY (INHALATION) 4 TIMES DAILY
Status: DISCONTINUED | OUTPATIENT
Start: 2022-12-07 | End: 2022-12-09 | Stop reason: HOSPADM

## 2022-12-07 RX ORDER — TECHNETIUM TC-99M SESTAMIBI 1 MG/10ML
10 INJECTION INTRAVENOUS
Status: COMPLETED | OUTPATIENT
Start: 2022-12-07 | End: 2022-12-07

## 2022-12-07 RX ORDER — TECHNETIUM TC-99M SESTAMIBI 1 MG/10ML
30 INJECTION INTRAVENOUS
Status: COMPLETED | OUTPATIENT
Start: 2022-12-07 | End: 2022-12-07

## 2022-12-07 RX ORDER — ALBUTEROL SULFATE 90 UG/1
2 AEROSOL, METERED RESPIRATORY (INHALATION) EVERY 4 HOURS PRN
Status: DISCONTINUED | OUTPATIENT
Start: 2022-12-07 | End: 2022-12-09 | Stop reason: HOSPADM

## 2022-12-07 RX ADMIN — ALBUTEROL SULFATE 2 PUFF: 90 AEROSOL, METERED RESPIRATORY (INHALATION) at 12:06

## 2022-12-07 RX ADMIN — ONDANSETRON 4 MG: 4 TABLET, ORALLY DISINTEGRATING ORAL at 15:18

## 2022-12-07 RX ADMIN — SODIUM CHLORIDE: 9 INJECTION, SOLUTION INTRAVENOUS at 03:47

## 2022-12-07 RX ADMIN — ACETAMINOPHEN 650 MG: 325 TABLET ORAL at 20:45

## 2022-12-07 RX ADMIN — ACETAMINOPHEN 650 MG: 325 TABLET ORAL at 03:50

## 2022-12-07 RX ADMIN — Medication 2 PUFF: at 12:05

## 2022-12-07 RX ADMIN — GUAIFENESIN 600 MG: 600 TABLET, EXTENDED RELEASE ORAL at 15:14

## 2022-12-07 RX ADMIN — GUAIFENESIN 600 MG: 600 TABLET, EXTENDED RELEASE ORAL at 20:45

## 2022-12-07 RX ADMIN — SODIUM CHLORIDE, PRESERVATIVE FREE 10 ML: 5 INJECTION INTRAVENOUS at 20:47

## 2022-12-07 RX ADMIN — ASPIRIN 81 MG: 81 TABLET, CHEWABLE ORAL at 15:14

## 2022-12-07 RX ADMIN — SODIUM CHLORIDE, PRESERVATIVE FREE 40 MG: 5 INJECTION INTRAVENOUS at 15:15

## 2022-12-07 RX ADMIN — ALBUTEROL SULFATE 2.5 MG: 2.5 SOLUTION RESPIRATORY (INHALATION) at 19:51

## 2022-12-07 RX ADMIN — AMINOPHYLLINE 75 MG: 25 INJECTION, SOLUTION INTRAVENOUS at 13:22

## 2022-12-07 RX ADMIN — SODIUM CHLORIDE, PRESERVATIVE FREE 10 ML: 5 INJECTION INTRAVENOUS at 15:52

## 2022-12-07 RX ADMIN — Medication 2 PUFF: at 08:35

## 2022-12-07 RX ADMIN — ALBUTEROL SULFATE 2 PUFF: 90 AEROSOL, METERED RESPIRATORY (INHALATION) at 08:35

## 2022-12-07 RX ADMIN — ACETAMINOPHEN 650 MG: 325 TABLET ORAL at 15:18

## 2022-12-07 RX ADMIN — KIT FOR THE PREPARATION OF TECHNETIUM TC99M SESTAMIBI 10 MILLICURIE: 1 INJECTION, POWDER, LYOPHILIZED, FOR SOLUTION PARENTERAL at 11:20

## 2022-12-07 RX ADMIN — Medication 2 PUFF: at 15:51

## 2022-12-07 RX ADMIN — ALBUTEROL SULFATE 2 PUFF: 90 AEROSOL, METERED RESPIRATORY (INHALATION) at 15:51

## 2022-12-07 RX ADMIN — REGADENOSON 0.4 MG: 0.08 INJECTION, SOLUTION INTRAVENOUS at 13:15

## 2022-12-07 RX ADMIN — KIT FOR THE PREPARATION OF TECHNETIUM TC99M SESTAMIBI 30 MILLICURIE: 1 INJECTION, POWDER, LYOPHILIZED, FOR SOLUTION PARENTERAL at 15:49

## 2022-12-07 RX ADMIN — ENOXAPARIN SODIUM 40 MG: 100 INJECTION SUBCUTANEOUS at 20:46

## 2022-12-07 RX ADMIN — DEXTROSE MONOHYDRATE 500 MG: 50 INJECTION, SOLUTION INTRAVENOUS at 15:10

## 2022-12-07 RX ADMIN — SODIUM CHLORIDE: 9 INJECTION, SOLUTION INTRAVENOUS at 20:44

## 2022-12-07 ASSESSMENT — PAIN DESCRIPTION - LOCATION
LOCATION: HEAD

## 2022-12-07 ASSESSMENT — ENCOUNTER SYMPTOMS
COUGH: 1
CHEST TIGHTNESS: 0
BLOOD IN STOOL: 0
EYE DISCHARGE: 0
VOMITING: 0
EYE PAIN: 0
SINUS PRESSURE: 0
ABDOMINAL PAIN: 0
NAUSEA: 0
DIARRHEA: 0
VOICE CHANGE: 0
BACK PAIN: 0
SHORTNESS OF BREATH: 1
SINUS PAIN: 0

## 2022-12-07 ASSESSMENT — PAIN SCALES - GENERAL
PAINLEVEL_OUTOF10: 3
PAINLEVEL_OUTOF10: 2
PAINLEVEL_OUTOF10: 2

## 2022-12-07 ASSESSMENT — PAIN - FUNCTIONAL ASSESSMENT
PAIN_FUNCTIONAL_ASSESSMENT: ACTIVITIES ARE NOT PREVENTED
PAIN_FUNCTIONAL_ASSESSMENT: PREVENTS OR INTERFERES SOME ACTIVE ACTIVITIES AND ADLS

## 2022-12-07 ASSESSMENT — PAIN DESCRIPTION - DESCRIPTORS
DESCRIPTORS: ACHING

## 2022-12-07 ASSESSMENT — PAIN SCALES - WONG BAKER
WONGBAKER_NUMERICALRESPONSE: 0
WONGBAKER_NUMERICALRESPONSE: 0

## 2022-12-07 NOTE — PLAN OF CARE
Problem: Discharge Planning  Goal: Discharge to home or other facility with appropriate resources  Outcome: Progressing     Problem: Skin/Tissue Integrity  Goal: Absence of new skin breakdown  Description: 1. Monitor for areas of redness and/or skin breakdown  2. Assess vascular access sites hourly  3. Every 4-6 hours minimum:  Change oxygen saturation probe site  4. Every 4-6 hours:  If on nasal continuous positive airway pressure, respiratory therapy assess nares and determine need for appliance change or resting period.   Outcome: Progressing     Problem: Safety - Adult  Goal: Free from fall injury  Outcome: Progressing     Problem: Nutrition Deficit:  Goal: Optimize nutritional status  Outcome: Progressing

## 2022-12-07 NOTE — PROGRESS NOTES
V2.0  [facility] [place]      Name:  Luis Tucker /Age/Sex: 1950  (67 y.o. female)   MRN & CSN:  [id number] & [id number] Encounter Date/Time: 2022 10:15 AM EST    Location:  -A PCP: No primary care provider on file. Hospital Day: 3    Assessment and Plan:   Luis Tucker is a 67 y.o. female with pmh of anemia, hypertension who presents with Community acquired pneumonia due to Chlamydia species      Plan:  Severe sepsis and acute hypoxic respiratory failure in the setting of parainfluenza infection: Presented with shortness of breath productive cough for 5 days. Was febrile at 103 °F.  Lactic acid level was 2.0. Chest x-ray showed left basilar consolidation. CT PE was negative. COVID test was negative. Positive for parainfluenza. On Levaquin. Change Levaquin to ceftriaxone and azithromycin for atypical coverage. De-escalate oxygen requirements. Currently on 2 L nasal cannula. White count is improving. Elevated troponins rule out ACS? Cardiology on board. Echo ordered. Telemetry ordered. Troponin initial 0.123 then 0.374 raising concern for NSTEMI. Now 0.131. On telemetry  Acute on chronic anemia with hemoglobin 9.3 in the ED. 14 in 2022. On Protonix. Based on iron studies look like anemia of chronic disease. Essential hypertension    Diet ADULT ORAL NUTRITION SUPPLEMENT; Breakfast, Lunch, Dinner; Standard High Calorie/High Protein Oral Supplement  ADULT DIET; Regular   DVT Prophylaxis [] Lovenox, []  Heparin, [] SCDs, [] Ambulation,  [] Eliquis, [] Xarelto  [] Coumadin   Code Status Limited   Disposition From: Home  Expected Disposition: Home  Estimated Date of Discharge: 2 to 3 days  Patient requires continued admission due to still on oxygen, feels weak. Recent diagnosis of parainfluenza   Surrogate Decision Maker/ POA      Subjective:     Chief Complaint: No chief complaint on file. The patient was seen at the bedside. No acute events . On 2 L nasal cannula. Cardiology is to see the patient today and give the recommendations regarding concern for NSTEMI type of picture. Review of Systems:    Review of Systems   Constitutional:  Negative for appetite change, chills, fever and unexpected weight change. HENT:  Negative for ear pain, hearing loss, sinus pressure, sinus pain and voice change. Eyes:  Negative for pain, discharge and visual disturbance. Respiratory:  Positive for cough and shortness of breath. Negative for chest tightness. Cardiovascular:  Negative for chest pain, palpitations and leg swelling. Gastrointestinal:  Negative for abdominal pain, blood in stool, diarrhea, nausea and vomiting. Genitourinary:  Negative for dysuria and frequency. Musculoskeletal:  Positive for arthralgias and myalgias. Negative for back pain. Neurological:  Positive for weakness. Negative for dizziness, light-headedness and headaches. Psychiatric/Behavioral:  Negative for sleep disturbance. Objective: Intake/Output Summary (Last 24 hours) at 12/7/2022 0932  Last data filed at 12/7/2022 0020  Gross per 24 hour   Intake --   Output 500 ml   Net -500 ml          Vitals:   Vitals:    12/07/22 0840   BP:    Pulse:    Resp:    Temp:    SpO2: 100%       Physical Exam:   Physical Exam  Vitals reviewed. Constitutional:       Appearance: Normal appearance. She is normal weight. HENT:      Head: Normocephalic. Nose: Nose normal.      Mouth/Throat:      Mouth: Mucous membranes are moist.   Eyes:      Conjunctiva/sclera: Conjunctivae normal.      Pupils: Pupils are equal, round, and reactive to light. Cardiovascular:      Rate and Rhythm: Normal rate and regular rhythm. Pulses: Normal pulses. Heart sounds: Normal heart sounds. No murmur heard. Pulmonary:      Effort: Pulmonary effort is normal.      Breath sounds: Rales present. No wheezing or rhonchi. Abdominal:      General: Abdomen is flat.  Bowel sounds are normal. There is no distension. Palpations: Abdomen is soft. Tenderness: There is no abdominal tenderness. Musculoskeletal:         General: No deformity. Normal range of motion. Cervical back: Normal range of motion and neck supple. Right lower leg: No edema. Left lower leg: No edema. Skin:     Coloration: Skin is not jaundiced or pale. Neurological:      General: No focal deficit present. Mental Status: She is alert and oriented to person, place, and time. Mental status is at baseline. Motor: Weakness present. Medications:   Medications:    cefTRIAXone (ROCEPHIN) IV  1,000 mg IntraVENous Q24H    azithromycin  500 mg IntraVENous Q24H    aspirin  81 mg Oral Daily    sodium chloride flush  5-40 mL IntraVENous 2 times per day    enoxaparin  40 mg SubCUTAneous QPM    guaiFENesin  600 mg Oral BID    pantoprazole (PROTONIX) 40 mg injection  40 mg IntraVENous Daily    albuterol sulfate HFA  2 puff Inhalation Q4H WA    ipratropium  2 puff Inhalation Q4H WA      Infusions:    sodium chloride      sodium chloride 100 mL/hr at 12/07/22 0347     PRN Meds: sodium chloride flush, 5-40 mL, PRN  sodium chloride, , PRN  ondansetron, 4 mg, Q8H PRN   Or  ondansetron, 4 mg, Q6H PRN  acetaminophen, 650 mg, Q6H PRN   Or  acetaminophen, 650 mg, Q6H PRN  guaiFENesin-dextromethorphan, 5 mL, Q4H PRN  benzonatate, 100 mg, TID PRN      Labs      No results found for this or any previous visit (from the past 24 hour(s)). Imaging/Diagnostics Last 24 Hours   No results found.     Electronically signed by Cecilio Pagan MD, MD on 12/7/2022 at 9:32 AM

## 2022-12-07 NOTE — CARE COORDINATION
CM met with pt to initiate discharge planning. Role of CM explained. Pt has insurance and is able to afford medication. Pt has PCP. Pt is from home with a roommate. Her son, [facility], provides her transportation. Pt has a cane & a walker but rarely uses them. She also has a BSC, shower chair and grab bars. She lives in a two-story home and she lives on the first floor and roommate lives on the second floor. Plan home, no needs. CM contact information given to pt. CM team available as needs arise. CM noted that pt did not have any contacts listed. Pt gave info to CM and CM entered info into the chart.

## 2022-12-07 NOTE — CONSULTS
Chart reviewed  Full note to follow                      Name:  Richard Cruz /Age/Sex: 1950  (67 y.o. female)   MRN & CSN:  [id number] & [id number] Admission Date/Time: 2022  5:41 AM   Location:  -A PCP: No primary care provider on file. Hospital Day: 3          Referring physician:  Brigida Flores MD         Reason for consultation: Elevated troponin        Thanks for referral.    Information source: Patient    CC; shortness of breath      HPI:   Thank you for involving me in taking  care of Richard Cruz who  is a 67 y. o.year  Old female  Presents with history of hypertension, chronic anemia     admitted with complains of increasing dyspnea has no chest pain elevated troponin was found on the initial visit; however a little bit, patient also had hypoxemic respiratory failure being treated with antibiotics              Past medical history:    has no past medical history on file. Past surgical history:   has no past surgical history on file. Social History:   reports that she has never smoked. She has never used smokeless tobacco. She reports that she does not drink alcohol and does not use drugs. Family history:  family history is not on file.     Allergies   Allergen Reactions    Ibuprofen      Reaction: Bleeding ulcer       cefTRIAXone (ROCEPHIN) 1,000 mg in dextrose 5 % 50 mL IVPB mini-bag, Q24H  azithromycin (ZITHROMAX) 500 mg in dextrose 5 % 250 mL IVPB ([id number]), Q24H  aspirin chewable tablet 81 mg, Daily  sodium chloride flush 0.9 % injection 5-40 mL, 2 times per day  sodium chloride flush 0.9 % injection 5-40 mL, PRN  0.9 % sodium chloride infusion, PRN  enoxaparin (LOVENOX) injection 40 mg, QPM  ondansetron (ZOFRAN-ODT) disintegrating tablet 4 mg, Q8H PRN   Or  ondansetron (ZOFRAN) injection 4 mg, Q6H PRN  acetaminophen (TYLENOL) tablet 650 mg, Q6H PRN   Or  acetaminophen (TYLENOL) suppository 650 mg, Q6H PRN  guaiFENesin (MUCINEX) extended release tablet 600 mg, BID  pantoprazole (PROTONIX) 40 mg in sodium chloride (PF) 0.9 % 10 mL injection, Daily  albuterol sulfate HFA (PROVENTIL;VENTOLIN;PROAIR) 108 (90 Base) MCG/ACT inhaler 2 puff, Q4H WA  ipratropium (ATROVENT HFA) 17 MCG/ACT inhaler 2 puff, Q4H WA  0.9 % sodium chloride infusion, Continuous  guaiFENesin-dextromethorphan (ROBITUSSIN DM) 100-10 MG/5ML syrup 5 mL, Q4H PRN  benzonatate (TESSALON) capsule 100 mg, TID PRN      Current Facility-Administered Medications   Medication Dose Route Frequency Provider Last Rate Last Admin    cefTRIAXone (ROCEPHIN) 1,000 mg in dextrose 5 % 50 mL IVPB mini-bag  1,000 mg IntraVENous Q24H Madeline Ureña MD   Stopped at 12/06/22 1428    azithromycin (ZITHROMAX) 500 mg in dextrose 5 % 250 mL IVPB ([id number])  500 mg IntraVENous Q24H Madeline Ureña MD   Stopped at 12/06/22 1503    aspirin chewable tablet 81 mg  81 mg Oral Daily Kye Dickinson MD   81 mg at 12/06/22 0841    sodium chloride flush 0.9 % injection 5-40 mL  5-40 mL IntraVENous 2 times per day Kye Dickinson MD   10 mL at 12/06/22 2051    sodium chloride flush 0.9 % injection 5-40 mL  5-40 mL IntraVENous PRN Kye Dickinson MD        0.9 % sodium chloride infusion   IntraVENous PRN Kye Dickinson MD        enoxaparin (LOVENOX) injection 40 mg  40 mg SubCUTAneous QPM Kye Dickinson MD   40 mg at 12/06/22 1756    ondansetron (ZOFRAN-ODT) disintegrating tablet 4 mg  4 mg Oral Q8H PRN Kye Dickinson MD        Or    ondansetron (ZOFRAN) injection 4 mg  4 mg IntraVENous Q6H PRN Kye Dickinson MD        acetaminophen (TYLENOL) tablet 650 mg  650 mg Oral Q6H PRN Kye Dickinson MD   650 mg at 12/07/22 0350    Or    acetaminophen (TYLENOL) suppository 650 mg  650 mg Rectal Q6H PRN Belvia Teena, MD        guaiFENesin (MUCINEX) extended release tablet 600 mg  600 mg Oral BID Kye Dickinson MD   600 mg at 12/06/22 2051    pantoprazole (PROTONIX) 40 mg in sodium chloride (PF) 0.9 % 10 mL injection  40 mg IntraVENous Daily Phu De La Rosa MD   40 mg at 12/06/22 0842    albuterol sulfate HFA (PROVENTIL;VENTOLIN;PROAIR) 108 (90 Base) MCG/ACT inhaler 2 puff  2 puff Inhalation Q4H ALANA Thibodeaux MD   2 puff at 12/07/22 0835    ipratropium (ATROVENT HFA) 17 MCG/ACT inhaler 2 puff  2 puff Inhalation Q4H ALANA Thibodeaux MD   2 puff at 12/07/22 0835    0.9 % sodium chloride infusion   IntraVENous Continuous Yvonne Thibodeaux  mL/hr at 12/07/22 0347 New Bag at 12/07/22 0347    guaiFENesin-dextromethorphan (ROBITUSSIN DM) 100-10 MG/5ML syrup 5 mL  5 mL Oral Q4H PRN Yvonne Thibodeaux MD   5 mL at 12/06/22 1341    benzonatate (TESSALON) capsule 100 mg  100 mg Oral TID PRN Yvonne Thibodeaux MD   100 mg at 12/06/22 0841     Review of Systems:  All 14 systems reviewed, all negative except for shortness of breath    Physical Examination:    /69   Pulse 91   Temp 99.3 °F (37.4 °C) (Oral)   Resp 23   Ht 5' 1\" (1.549 m)   Wt 183 lb 6.8 oz (83.2 kg)   SpO2 100%   BMI 34.66 kg/m²      Wt Readings from Last 3 Encounters:   12/07/22 183 lb 6.8 oz (83.2 kg)     Body mass index is 34.66 kg/m². General Appearance: Fair  Head: normocephalic     Eyes: normal, noninjected conjunctiva    ENT: normal mucosa, noninjected throat, normal     NECK: No JVP  No thyromegaly        Cardiovascular: No thrills palpated   Auscultation: Normal S1 and S2, no murmur   carotid bruit no   Abdominal Aorta no bruit    Respiratory:    Breath sounds diminished bilaterally    Extremities: Trace edema clubbing ,   no cyanosis    SKIN: Warm and well perfused, no pallor or cyanosis    Vascular exam:  Pedal Pulses: Palp bilaterally        Abdomen:  No masses or tenderness. No organomegaly noted. Neurological:  Oriented to time, place, and person   No focal neurological deficit noted. Psychiatric:normal mood, no anxiety    Lab Review   No results found for this or any previous visit (from the past 24 hour(s)).         Assessment/Recommendations:     -Elevated troponin possible non-STEMI echocardiogram is being obtained and we will also obtain a Lexiscan on her for further assessment given that the troponin is in a positive range  -Anemia hemoglobin is 8.4  - sob sec to pulm marly Chapin MD, 12/7/2022 10:57 AM       Please note this report has been partially produced using speech recognition software and may contain errors related to that system including errors in grammar, punctuation, and spelling, as well as words and phrases that may be inappropriate. If there are any questions or concerns please feel free to contact the dictating provider for clarification.

## 2022-12-07 NOTE — PROGRESS NOTES
Dr Kim Horner - Cardiology paged via [facility] for new consult for AtpyicAL cHEST PAIN acs R/O.

## 2022-12-07 NOTE — PROGRESS NOTES
Pt is alert, oriented, cooperative withc are. Pt npo for test, pt has all needs met, returns from procedure, no complaints, [facility] den[facility] complaint,thanks staff for care.

## 2022-12-08 PROCEDURE — 6370000000 HC RX 637 (ALT 250 FOR IP): Performed by: NURSE PRACTITIONER

## 2022-12-08 PROCEDURE — 6360000002 HC RX W HCPCS: Performed by: STUDENT IN AN ORGANIZED HEALTH CARE EDUCATION/TRAINING PROGRAM

## 2022-12-08 PROCEDURE — 2060000000 HC ICU INTERMEDIATE R&B

## 2022-12-08 PROCEDURE — APPSS60 APP SPLIT SHARED TIME 46-60 MINUTES: Performed by: NURSE PRACTITIONER

## 2022-12-08 PROCEDURE — 6370000000 HC RX 637 (ALT 250 FOR IP): Performed by: STUDENT IN AN ORGANIZED HEALTH CARE EDUCATION/TRAINING PROGRAM

## 2022-12-08 PROCEDURE — 99233 SBSQ HOSP IP/OBS HIGH 50: CPT | Performed by: INTERNAL MEDICINE

## 2022-12-08 PROCEDURE — 2580000003 HC RX 258: Performed by: STUDENT IN AN ORGANIZED HEALTH CARE EDUCATION/TRAINING PROGRAM

## 2022-12-08 PROCEDURE — 94761 N-INVAS EAR/PLS OXIMETRY MLT: CPT

## 2022-12-08 PROCEDURE — C9113 INJ PANTOPRAZOLE SODIUM, VIA: HCPCS | Performed by: STUDENT IN AN ORGANIZED HEALTH CARE EDUCATION/TRAINING PROGRAM

## 2022-12-08 PROCEDURE — 2700000000 HC OXYGEN THERAPY PER DAY

## 2022-12-08 PROCEDURE — 94640 AIRWAY INHALATION TREATMENT: CPT

## 2022-12-08 RX ORDER — LOSARTAN POTASSIUM 25 MG/1
25 TABLET ORAL DAILY
Status: DISCONTINUED | OUTPATIENT
Start: 2022-12-08 | End: 2022-12-09 | Stop reason: HOSPADM

## 2022-12-08 RX ORDER — PANTOPRAZOLE SODIUM 40 MG/1
40 TABLET, DELAYED RELEASE ORAL
Status: DISCONTINUED | OUTPATIENT
Start: 2022-12-09 | End: 2022-12-09 | Stop reason: HOSPADM

## 2022-12-08 RX ORDER — AZITHROMYCIN 250 MG/1
500 TABLET, FILM COATED ORAL DAILY
Status: DISCONTINUED | OUTPATIENT
Start: 2022-12-09 | End: 2022-12-09 | Stop reason: HOSPADM

## 2022-12-08 RX ADMIN — ASPIRIN 81 MG: 81 TABLET, CHEWABLE ORAL at 09:48

## 2022-12-08 RX ADMIN — CEFTRIAXONE SODIUM 1000 MG: 1 INJECTION, POWDER, FOR SOLUTION INTRAMUSCULAR; INTRAVENOUS at 09:57

## 2022-12-08 RX ADMIN — SODIUM CHLORIDE, PRESERVATIVE FREE 40 MG: 5 INJECTION INTRAVENOUS at 09:49

## 2022-12-08 RX ADMIN — BENZONATATE 100 MG: 100 CAPSULE ORAL at 04:28

## 2022-12-08 RX ADMIN — GUAIFENESIN 600 MG: 600 TABLET, EXTENDED RELEASE ORAL at 09:48

## 2022-12-08 RX ADMIN — SODIUM CHLORIDE, PRESERVATIVE FREE 10 ML: 5 INJECTION INTRAVENOUS at 19:48

## 2022-12-08 RX ADMIN — ACETAMINOPHEN 650 MG: 325 TABLET ORAL at 02:51

## 2022-12-08 RX ADMIN — ALBUTEROL SULFATE 2.5 MG: 2.5 SOLUTION RESPIRATORY (INHALATION) at 22:26

## 2022-12-08 RX ADMIN — ACETAMINOPHEN 650 MG: 325 TABLET ORAL at 14:05

## 2022-12-08 RX ADMIN — ENOXAPARIN SODIUM 40 MG: 100 INJECTION SUBCUTANEOUS at 18:19

## 2022-12-08 RX ADMIN — ALBUTEROL SULFATE 2.5 MG: 2.5 SOLUTION RESPIRATORY (INHALATION) at 08:45

## 2022-12-08 RX ADMIN — DEXTROSE MONOHYDRATE 500 MG: 50 INJECTION, SOLUTION INTRAVENOUS at 12:14

## 2022-12-08 RX ADMIN — LOSARTAN POTASSIUM 25 MG: 25 TABLET, FILM COATED ORAL at 09:49

## 2022-12-08 RX ADMIN — GUAIFENESIN 600 MG: 600 TABLET, EXTENDED RELEASE ORAL at 19:48

## 2022-12-08 RX ADMIN — ALBUTEROL SULFATE 2.5 MG: 2.5 SOLUTION RESPIRATORY (INHALATION) at 12:25

## 2022-12-08 RX ADMIN — SODIUM CHLORIDE, PRESERVATIVE FREE 10 ML: 5 INJECTION INTRAVENOUS at 09:50

## 2022-12-08 ASSESSMENT — ENCOUNTER SYMPTOMS
VOMITING: 0
BACK PAIN: 0
DIARRHEA: 0
EYE PAIN: 0
BLOOD IN STOOL: 0
NAUSEA: 0
SINUS PRESSURE: 0
ABDOMINAL PAIN: 0
EYE DISCHARGE: 0
VOICE CHANGE: 0
CHEST TIGHTNESS: 0
SINUS PAIN: 0
COUGH: 1
SHORTNESS OF BREATH: 1

## 2022-12-08 ASSESSMENT — PAIN DESCRIPTION - LOCATION
LOCATION: HEAD
LOCATION: HEAD

## 2022-12-08 ASSESSMENT — PAIN SCALES - WONG BAKER: WONGBAKER_NUMERICALRESPONSE: 0

## 2022-12-08 ASSESSMENT — PAIN SCALES - GENERAL
PAINLEVEL_OUTOF10: 0
PAINLEVEL_OUTOF10: 3
PAINLEVEL_OUTOF10: 3

## 2022-12-08 ASSESSMENT — PAIN DESCRIPTION - DESCRIPTORS
DESCRIPTORS: ACHING
DESCRIPTORS: ACHING

## 2022-12-08 ASSESSMENT — PAIN - FUNCTIONAL ASSESSMENT
PAIN_FUNCTIONAL_ASSESSMENT: PREVENTS OR INTERFERES SOME ACTIVE ACTIVITIES AND ADLS
PAIN_FUNCTIONAL_ASSESSMENT: ACTIVITIES ARE NOT PREVENTED

## 2022-12-08 ASSESSMENT — PAIN DESCRIPTION - ORIENTATION: ORIENTATION: MID

## 2022-12-08 NOTE — PROGRESS NOTES
Cardiologist called about stress test results, no need for prep, heart cath to follow upon stabilazation of infection.

## 2022-12-08 NOTE — PROGRESS NOTES
V2.0  [facility] [place]      Name:  Emily Rivera /Age/Sex: 1950  (67 y.o. female)   MRN & CSN:  [id number] & [id number] Encounter Date/Time: 2022 10:15 AM EST    Location:  -A PCP: Matt Clinton [address] [place] [address]  Day: 4    Assessment and Plan:   Emily Rivera is a 67 y.o. female with pmh of anemia, hypertension who presents with Community acquired pneumonia due to Chlamydia species      Plan:  Severe sepsis and acute hypoxic respiratory failure in the setting of parainfluenza infection: Presented with shortness of breath productive cough for 5 days. Was febrile at 103 °F.  Lactic acid level was 2.0. Chest x-ray showed left basilar consolidation. CT PE was negative. COVID test was negative. Positive for parainfluenza. On Levaquin. Change Levaquin to ceftriaxone and azithromycin for atypical coverage. De-escalate oxygen requirements. Currently on 2 L nasal cannula. White count is improving. Discontinue IV fluids. Will order home health care. Plan for likely discharge tomorrow  Elevated troponins rule out ACS? Cardiology on board. Echocardiogram showed grade 1 diastolic heart failure with preserved ejection fraction. Cardiology has a plan for Lexiscan that showed moderate sized defect of the moderate severity which is reversible involving the anterior wall of the myocardium. Will wait for further recommendation from cardiology prior to discharge. Acute on chronic anemia with hemoglobin 9.3 in the ED. 14 in 2022. On Protonix. Based on iron studies look like anemia of chronic disease. Essential hypertension    Diet ADULT ORAL NUTRITION SUPPLEMENT; Breakfast, Lunch, Dinner; Standard High Calorie/High Protein Oral Supplement  ADULT DIET;  Regular   DVT Prophylaxis [] Lovenox, []  Heparin, [] SCDs, [] Ambulation,  [] Eliquis, [] Xarelto  [] Coumadin   Code Status Limited   Disposition From: Home  Expected Disposition: Home  Estimated Date of Discharge: 2 to 3 days  Patient requires continued admission due to still on oxygen, feels weak. Recent diagnosis of parainfluenza   Surrogate Decision Maker/ POA      Subjective:     Chief Complaint: No chief complaint on file. The patient was seen at the bedside. No acute events . On 2 L nasal cannula. Cardiology is to see the patient today and give the recommendations regarding concern for NSTEMI type of picture. Review of Systems:    Review of Systems   Constitutional:  Negative for appetite change, chills, fever and unexpected weight change. HENT:  Negative for ear pain, hearing loss, sinus pressure, sinus pain and voice change. Eyes:  Negative for pain, discharge and visual disturbance. Respiratory:  Positive for cough and shortness of breath. Negative for chest tightness. Cardiovascular:  Negative for chest pain, palpitations and leg swelling. Gastrointestinal:  Negative for abdominal pain, blood in stool, diarrhea, nausea and vomiting. Genitourinary:  Negative for dysuria and frequency. Musculoskeletal:  Positive for arthralgias and myalgias. Negative for back pain. Neurological:  Positive for weakness. Negative for dizziness, light-headedness and headaches. Psychiatric/Behavioral:  Negative for sleep disturbance. Objective: Intake/Output Summary (Last 24 hours) at 12/8/2022 1008  Last data filed at 12/8/2022 0220  Gross per 24 hour   Intake 360 ml   Output 1250 ml   Net -890 ml          Vitals:   Vitals:    12/08/22 0900   BP: (!) 182/75   Pulse: 71   Resp: 22   Temp: 98.5 °F (36.9 °C)   SpO2: 95%       Physical Exam:   Physical Exam  Vitals reviewed. Constitutional:       Appearance: Normal appearance. She is normal weight. HENT:      Head: Normocephalic. Nose: Nose normal.      Mouth/Throat:      Mouth: Mucous membranes are moist.   Eyes:      Conjunctiva/sclera: Conjunctivae normal.      Pupils: Pupils are equal, round, and reactive to light. Cardiovascular:      Rate and Rhythm: Normal rate and regular rhythm. Pulses: Normal pulses. Heart sounds: Normal heart sounds. No murmur heard. Pulmonary:      Effort: Pulmonary effort is normal.      Breath sounds: Rales present. No wheezing or rhonchi. Abdominal:      General: Abdomen is flat. Bowel sounds are normal. There is no distension. Palpations: Abdomen is soft. Tenderness: There is no abdominal tenderness. Musculoskeletal:         General: No deformity. Normal range of motion. Cervical back: Normal range of motion and neck supple. Right lower leg: No edema. Left lower leg: No edema. Skin:     Coloration: Skin is not jaundiced or pale. Neurological:      General: No focal deficit present. Mental Status: She is alert and oriented to person, place, and time. Mental status is at baseline. Motor: Weakness present. Medications:   Medications:    losartan  25 mg Oral Daily    albuterol  2.5 mg Nebulization 4x daily    cefTRIAXone (ROCEPHIN) IV  1,000 mg IntraVENous Q24H    azithromycin  500 mg IntraVENous Q24H    aspirin  81 mg Oral Daily    sodium chloride flush  5-40 mL IntraVENous 2 times per day    enoxaparin  40 mg SubCUTAneous QPM    guaiFENesin  600 mg Oral BID    pantoprazole (PROTONIX) 40 mg injection  40 mg IntraVENous Daily      Infusions:    sodium chloride      sodium chloride 100 mL/hr at 12/07/22 2044     PRN Meds: albuterol sulfate HFA, 2 puff, Q4H PRN  ipratropium, 2 puff, 4x Daily PRN  sodium chloride flush, 5-40 mL, PRN  sodium chloride, , PRN  ondansetron, 4 mg, Q8H PRN   Or  ondansetron, 4 mg, Q6H PRN  acetaminophen, 650 mg, Q6H PRN   Or  acetaminophen, 650 mg, Q6H PRN  guaiFENesin-dextromethorphan, 5 mL, Q4H PRN  benzonatate, 100 mg, TID PRN      Labs      No results found for this or any previous visit (from the past 24 hour(s)). Imaging/Diagnostics Last 24 Hours   No results found.     Electronically signed by [facility] Kelton Dsouza MD, MD on 12/8/2022 at 10:08 AM

## 2022-12-08 NOTE — CONSULTS
IV TO PO EVALUATION  -Patients considered for IV to PO conversion should meet all of the   following inclusion criteria and none of the exclusion criteria. MEDICATION(S) CONSIDERED FOR CONVERSION:  -Pantoprazole 40mg ivp daily  -Azithromycin 500mg ivpb q24h for 5 days total    EXCLUSION CRITERIA:  -Criteria that the patient is NOT a candidate for conversion. .. [] Infections requiring IV therapy  [] Recent therapeutic failure on oral formulation  [] Meningitis  [] Osteomyelitis  [] Other  [] Patients with unreliable response to oral medication  [] Nausea and/or vomiting or diarrhea within previous 24 hours  [] Malabsorption disorders  [] Motility disorders of GI tract including ileus or bowel obstruction  [] Patients who cannot use oral route  [] Painful oral ulcerations  [] Unable to swallow  [] NPO  [] Clinical deterioration requiring vasopressor therapy for blood pressure support  [] Active bleeding (Proton Pump Inhibitors/H2 receptor blockers only)  [x] NO EXCLUSION CRITERIA NOTED      INCLUSION CRITERIA:   -Criteria to initiate medication route switch. .. [x] No exclusion criteria met as described above  [x] IV therapy for >24 hours, afebrile, improving trend in WBC (antibiotics only)  [x] Tolerating oral diet, may include  Clear liquids >1000 mL/day  Tube feeds without high residuals (<150 mL) at least 40 mL/hr  [] Tolerating oral medications  [] No seizures for 72 hours (antiepileptic medications only)    Please note, patients may be given suppositories when available for ordered product if no contraindications exist (ie. .. rectal surgery or infection). Oral solutions/suspensions may be considered for patients with a functioning enteral tube not on continuous suction, if available.     MEDICATION(S) TO BE CONVERTED:  [] Patient does NOT meet criteria for conversion    -Change pantoprazole 40mg ivp daily to 40mg po daily  -Change azithromycin 500mg ivpb q24h to 500mg daily for remaining 2 days of therapy (shortage of iv product at this time    Sheeba Delatorre.  Alicia Borges, [facility]   12/08/22 4:37 PM    .Elise Rehman

## 2022-12-08 NOTE — PROGRESS NOTES
Cardiology Progress Note     Today's Plan: monitor respiratory status    Admit Date:  12/5/2022    Consult reason/ Seen today for: NSTEMI    Subjective and  Overnight Events:  patient states that she is feeling a little better. Still feels short of breath. Assessment and Plan:  NSTEMI: abnormal stress test. Will plan for LHC when respiratory status is improved. Likely outpatient  Shortness of breath: sepsis with parainfluenza. Continue per primary  SVT vs Atrial fibrillation. In setting of acute infection continue to monitor on telemetry. Telemetry Reviewed:    Sinus rhythm     ECHO :   Echocardiogram 12/5/2022   Summary   Left ventricular systolic function is hyperdynamic. Ejection fraction is visually estimated at 65-70%. Grade I diastolic dysfunction. Trace tricuspid regurgitation; RVSP: 36 mmHg. No evidence of any pericardial effusion. History of Presenting Illness:    Chief complain on admission : 67 y. o.year old who is admitted forNo chief complaint on file. Past medical history:    has no past medical history on file. Past surgical history:   has no past surgical history on file. Social History:   reports that she has never smoked. She has never used smokeless tobacco. She reports that she does not drink alcohol and does not use drugs. Family history:  family history is not on file. Allergies   Allergen Reactions    Ibuprofen      Reaction: Bleeding ulcer       Review of Systems   All 14 systems were reviewed and are negative  Except for the positive findings  which as documented     BP (!) 170/56   Pulse 84   Temp 99.5 °F (37.5 °C) (Oral)   Resp (!) 31   Ht 5' 1\" (1.549 m)   Wt 182 lb 1.6 oz (82.6 kg)   SpO2 98%   BMI 34.41 kg/m²     Intake/Output Summary (Last 24 hours) at 12/8/2022 1300  Last data filed at 12/8/2022 0220  Gross per 24 hour   Intake 360 ml   Output 1250 ml   Net -890 ml       Physical Exam  Vitals reviewed.    Constitutional: General: She is not in acute distress. Appearance: Normal appearance. She is obese. She is not ill-appearing. HENT:      Head: Atraumatic. Neck:      Vascular: No carotid bruit. Cardiovascular:      Rate and Rhythm: Normal rate and regular rhythm. Pulses: Normal pulses. Heart sounds: Normal heart sounds. No murmur heard. Pulmonary:      Effort: Pulmonary effort is normal. No respiratory distress. Breath sounds: Rhonchi present. Musculoskeletal:         General: No swelling or deformity. Cervical back: Neck supple. No muscular tenderness. Neurological:      Mental Status: She is alert. Medications:    losartan  25 mg Oral Daily    albuterol  2.5 mg Nebulization 4x daily    cefTRIAXone (ROCEPHIN) IV  1,000 mg IntraVENous Q24H    azithromycin  500 mg IntraVENous Q24H    aspirin  81 mg Oral Daily    sodium chloride flush  5-40 mL IntraVENous 2 times per day    enoxaparin  40 mg SubCUTAneous QPM    guaiFENesin  600 mg Oral BID    pantoprazole (PROTONIX) 40 mg injection  40 mg IntraVENous Daily      sodium chloride      sodium chloride 100 mL/hr at 12/07/22 2044     albuterol sulfate HFA, ipratropium, sodium chloride flush, sodium chloride, ondansetron **OR** ondansetron, acetaminophen **OR** acetaminophen, guaiFENesin-dextromethorphan, benzonatate    Lab Data:  CBC:   Recent Labs     12/06/22  0341   WBC 9.6   HGB 8.4*   HCT 28.7*   MCV 85.2        BMP:   Recent Labs     12/06/22  0341      K 4.2      CO2 23   BUN 15   CREATININE 0.8     PT/INR: No results for input(s): PROTIME, INR in the last 72 hours. BNP:  No results for input(s): PROBNP in the last 72 hours. TROPONIN: No results for input(s): TROPONINT in the last 72 hours. All labs, medications and tests reviewed by myself , continue all other medications of all above medical condition listed as is except for changes mentioned above.     Thank you very much for consult , please call with questions. Electronically signed by BERNY Dejesus CNP on 2022 at 1:00 PM     I have seen ,spoken to  and examined this patient personally, independently of the NICHOLAS. I have reviewed the hospital care given to date and reviewed all pertinent labs and imaging. I have spoken with patient, nursing staff and provided written and verbal instructions . The above note has been reviewed     CARDIOLOGY ATTENDING ADDENDUM    HPI:  I have reviewed the above HPI  And agree with above     Pulse Range: Pulse  Av.8  Min: 65  Max: 89    Blood Presuure Range:  Systolic (97QTD), XIB:394 , Min:120 , QDY:740   ; Diastolic (88NCX), FIO:05, Min:54, Max:79      Pulse ox Range: SpO2  Av.2 %  Min: 95 %  Max: 100 %    24hr I & O:    Intake/Output Summary (Last 24 hours) at 2022 1326  Last data filed at 2022 0220  Gross per 24 hour   Intake 360 ml   Output 1250 ml   Net -890 ml         BP (!) 170/56   Pulse 84   Temp 99.5 °F (37.5 °C) (Oral)   Resp (!) 31   Ht 5' 1\" (1.549 m)   Wt 182 lb 1.6 oz (82.6 kg)   SpO2 98%   BMI 34.41 kg/m²       Physical Exam:  General:  Awake, alert, NAD  Head:normal  Eye:normal  Neck:  No JVD   Chest:  Clear to auscultation, respiration easy  Cardiovascular:  RRR S1S2  Abdomen:   nontender  Extremities:  tr edema  Pulses; palpable  Neuro: grossly normal      MEDICAL DECISION MAKING;    Pt assessed , chart reviewed, patient examined examined , all available data was reviewed, following is the plan which was discussed with NICHOLAS as well:    -Elevated troponin possible non-STEMI patient had a Cardiolite stress test done yesterday which showed moderate degree of anterior wall ischemia and she will need cardiac catheterization once her respiratory status is improved from her pulmonary issues with viral and bacterial infections  -On telemetry patient had short run of SVT we will continue to monitor if she has recurrences she will need to be anticoagulated for atrial fibrillation heart please note that her hemoglobin is low  -Anemia hemoglobin is 8.4  - sob sec to pulm issues          Amando Bansal MD [facility] [place]

## 2022-12-09 VITALS
HEIGHT: 61 IN | TEMPERATURE: 99.2 F | RESPIRATION RATE: 22 BRPM | SYSTOLIC BLOOD PRESSURE: 177 MMHG | WEIGHT: 182.1 LBS | OXYGEN SATURATION: 90 % | HEART RATE: 86 BPM | BODY MASS INDEX: 34.38 KG/M2 | DIASTOLIC BLOOD PRESSURE: 68 MMHG

## 2022-12-09 PROBLEM — J16.0 COMMUNITY ACQUIRED PNEUMONIA DUE TO CHLAMYDIA SPECIES: Status: RESOLVED | Noted: 2022-12-05 | Resolved: 2022-12-09

## 2022-12-09 PROBLEM — I21.4 NSTEMI (NON-ST ELEVATED MYOCARDIAL INFARCTION) (HCC): Status: RESOLVED | Noted: 2022-12-07 | Resolved: 2022-12-09

## 2022-12-09 PROCEDURE — 2700000000 HC OXYGEN THERAPY PER DAY

## 2022-12-09 PROCEDURE — 6360000002 HC RX W HCPCS: Performed by: STUDENT IN AN ORGANIZED HEALTH CARE EDUCATION/TRAINING PROGRAM

## 2022-12-09 PROCEDURE — 2580000003 HC RX 258: Performed by: STUDENT IN AN ORGANIZED HEALTH CARE EDUCATION/TRAINING PROGRAM

## 2022-12-09 PROCEDURE — 6370000000 HC RX 637 (ALT 250 FOR IP): Performed by: NURSE PRACTITIONER

## 2022-12-09 PROCEDURE — 94664 DEMO&/EVAL PT USE INHALER: CPT

## 2022-12-09 PROCEDURE — 94761 N-INVAS EAR/PLS OXIMETRY MLT: CPT

## 2022-12-09 PROCEDURE — 6370000000 HC RX 637 (ALT 250 FOR IP): Performed by: STUDENT IN AN ORGANIZED HEALTH CARE EDUCATION/TRAINING PROGRAM

## 2022-12-09 PROCEDURE — 94640 AIRWAY INHALATION TREATMENT: CPT

## 2022-12-09 PROCEDURE — 99232 SBSQ HOSP IP/OBS MODERATE 35: CPT | Performed by: INTERNAL MEDICINE

## 2022-12-09 PROCEDURE — 94618 PULMONARY STRESS TESTING: CPT

## 2022-12-09 RX ORDER — PANTOPRAZOLE SODIUM 40 MG/1
40 TABLET, DELAYED RELEASE ORAL
Qty: 30 TABLET | Refills: 3 | Status: SHIPPED | OUTPATIENT
Start: 2022-12-10

## 2022-12-09 RX ORDER — AZITHROMYCIN 500 MG/1
500 TABLET, FILM COATED ORAL DAILY
Qty: 1 TABLET | Refills: 0 | Status: SHIPPED | OUTPATIENT
Start: 2022-12-10 | End: 2022-12-11

## 2022-12-09 RX ORDER — ASPIRIN 81 MG/1
81 TABLET, CHEWABLE ORAL DAILY
Qty: 30 TABLET | Refills: 3 | Status: SHIPPED | OUTPATIENT
Start: 2022-12-10

## 2022-12-09 RX ORDER — LOSARTAN POTASSIUM 25 MG/1
25 TABLET ORAL DAILY
Qty: 30 TABLET | Refills: 3 | Status: SHIPPED | OUTPATIENT
Start: 2022-12-10

## 2022-12-09 RX ORDER — ALBUTEROL SULFATE 2.5 MG/3ML
2.5 SOLUTION RESPIRATORY (INHALATION) 4 TIMES DAILY
Qty: 120 EACH | Refills: 3 | Status: SHIPPED | OUTPATIENT
Start: 2022-12-09

## 2022-12-09 RX ADMIN — LOSARTAN POTASSIUM 25 MG: 25 TABLET, FILM COATED ORAL at 09:26

## 2022-12-09 RX ADMIN — GUAIFENESIN 600 MG: 600 TABLET, EXTENDED RELEASE ORAL at 09:26

## 2022-12-09 RX ADMIN — GUAIFENESIN AND DEXTROMETHORPHAN 5 ML: 100; 10 SYRUP ORAL at 01:30

## 2022-12-09 RX ADMIN — AZITHROMYCIN MONOHYDRATE 500 MG: 250 TABLET ORAL at 09:25

## 2022-12-09 RX ADMIN — ALBUTEROL SULFATE 2.5 MG: 2.5 SOLUTION RESPIRATORY (INHALATION) at 15:28

## 2022-12-09 RX ADMIN — ALBUTEROL SULFATE 2.5 MG: 2.5 SOLUTION RESPIRATORY (INHALATION) at 11:28

## 2022-12-09 RX ADMIN — SODIUM CHLORIDE: 9 INJECTION, SOLUTION INTRAVENOUS at 09:32

## 2022-12-09 RX ADMIN — ASPIRIN 81 MG: 81 TABLET, CHEWABLE ORAL at 09:26

## 2022-12-09 RX ADMIN — GUAIFENESIN AND DEXTROMETHORPHAN 5 ML: 100; 10 SYRUP ORAL at 09:31

## 2022-12-09 RX ADMIN — PANTOPRAZOLE SODIUM 40 MG: 40 TABLET, DELAYED RELEASE ORAL at 09:25

## 2022-12-09 RX ADMIN — ALBUTEROL SULFATE 2.5 MG: 2.5 SOLUTION RESPIRATORY (INHALATION) at 08:08

## 2022-12-09 RX ADMIN — SODIUM CHLORIDE, PRESERVATIVE FREE 10 ML: 5 INJECTION INTRAVENOUS at 09:24

## 2022-12-09 RX ADMIN — ACETAMINOPHEN 650 MG: 325 TABLET ORAL at 09:24

## 2022-12-09 RX ADMIN — BENZONATATE 100 MG: 100 CAPSULE ORAL at 09:31

## 2022-12-09 RX ADMIN — CEFTRIAXONE SODIUM 1000 MG: 1 INJECTION, POWDER, FOR SOLUTION INTRAMUSCULAR; INTRAVENOUS at 09:36

## 2022-12-09 RX ADMIN — BENZONATATE 100 MG: 100 CAPSULE ORAL at 01:30

## 2022-12-09 ASSESSMENT — PAIN DESCRIPTION - LOCATION: LOCATION: HEAD

## 2022-12-09 ASSESSMENT — PAIN DESCRIPTION - DESCRIPTORS: DESCRIPTORS: ACHING

## 2022-12-09 ASSESSMENT — PAIN - FUNCTIONAL ASSESSMENT: PAIN_FUNCTIONAL_ASSESSMENT: ACTIVITIES ARE NOT PREVENTED

## 2022-12-09 ASSESSMENT — PAIN SCALES - GENERAL: PAINLEVEL_OUTOF10: 3

## 2022-12-09 ASSESSMENT — PAIN DESCRIPTION - ORIENTATION: ORIENTATION: MID

## 2022-12-09 NOTE — PROGRESS NOTES
Patient was seen in hospital for parainfluenza Infection. I am prescribing oxygen because the diagnosis and testing requires the patient to have oxygen in the home. Conditions will improve or be benefited by oxygen use. The patient is able to perform good mobility and therefore requires the use of a portable oxygen system for ambulation.

## 2022-12-09 NOTE — PROGRESS NOTES
Educated and instructed patient on use of HOME O2 from [facility].

## 2022-12-09 NOTE — PROGRESS NOTES
Discharge paperwork reviewed with pt and family. All questions answered. IV removed, paperwork with belongings. Pt and family state feeling comfortable with discharging home with oxygen. Pt to be taken out by wheelchair for discharge home.

## 2022-12-09 NOTE — DISCHARGE INSTRUCTIONS
Make and keep all follow up appointments     Take all medications as directed    Resume activity as tolerated    Wear o2 as directed and monitor o2 levels    Continue healthy diet

## 2022-12-09 NOTE — PROGRESS NOTES
Home O2 Eval:   Faxed and Called [facility] # [phone]. They will bring patient a tank for transport home. PLEASE DO NOT LET PATIENT Yancy Giron 44 WITH OUT HER HOME OXYGEN.

## 2022-12-09 NOTE — PROGRESS NOTES
CARDIOLOGY  NOTE        Name:  Miriam Gowers /Age/Sex: 1950  (67 y.o. female)   MRN & CSN:  [id number] & [id number] Admission Date/Time: 2022  5:41 AM   Location:  -A PCP: Karen Hawley Day: 5        PLAN FROM CARDIOLOGY FOR TODAY:   Patient is a very abnormal Cardiolite and had non-STEMI and she will undergo cardiac catheterization once her current pulmonary status and infectious process is resolved  Patient is getting discharged and will be scheduled for a cardiac cath as an outpatient I will follow her up in the office      - cardiology consult is for: Elevated troponin    -  Interval history: Shortness of breath    ASSESSMENT/ PLAN:      -Elevated troponin possible non-STEMI patient had a Cardiolite stress test done yesterday which showed moderate degree of anterior wall ischemia and she will need cardiac catheterization once her respiratory status is improved from her pulmonary issues with viral and bacterial infections  -On telemetry patient had short run of SVT we will continue to monitor if she has recurrences she will need to be anticoagulated for atrial fibrillation heart please note that her hemoglobin is low  -Anemia hemoglobin is 8.4  - sob sec to pulm issues            Subjective: Todays complain: Patient short of breath    HPI:  Zay Astudillo is a 67 y. o.year old who and presents with had no chief complaint listed for this encounter. No chief complaint on file. Objective: Temperature:  Current - Temp: 99.2 °F (37.3 °C);  Max - Temp  Av.4 °F (36.9 °C)  Min: 97.9 °F (36.6 °C)  Max: 99.2 °F (37.3 °C)    Respiratory Rate : Resp  Av.4  Min: 12  Max: 32    Pulse Range: Pulse  Av.5  Min: 60  Max: 76    Blood Presuure Range:  Systolic (66AOS), ZOD:161 , Min:157 , YMM:469   ; Diastolic (48PCQ), SHEEBA:80, Min:50, Max:68      Pulse ox Range: SpO2  Av.8 %  Min: 90 %  Max: 99 %    24hr I & O:    Intake/Output Summary (Last 24 hours) at 12/9/2022 1529  Last data filed at 12/9/2022 0655  Gross per 24 hour   Intake --   Output 3600 ml   Net -3600 ml         BP (!) 177/68   Pulse 68   Temp 99.2 °F (37.3 °C) (Oral)   Resp 24   Ht 5' 1\" (1.549 m)   Wt 182 lb 1.6 oz (82.6 kg)   SpO2 90%   BMI 34.41 kg/m²           Review of Systems:    Short of breath    TELEMETRY: Sinus    has no past medical history on file. has no past surgical history on file. Physical Exam:  General:  Awake, alert, NAD  Head:normal  Eye: Pupils equal and round  Neck:  No JVD, no carotid bruit noted   Chest:  Clear to auscultation, no signs of respiratory distress  Cardiovascular:  Normal rate and rhythm. S1 and S2 noted. No murmurs rubs or gallops  Abdomen:   nontender  Extremities:  no edema  Pulses; palpable  Neuro: grossly normal    Medications:    losartan  25 mg Oral Daily    pantoprazole  40 mg Oral QAM AC    azithromycin  500 mg Oral Daily    albuterol  2.5 mg Nebulization 4x daily    cefTRIAXone (ROCEPHIN) IV  1,000 mg IntraVENous Q24H    aspirin  81 mg Oral Daily    sodium chloride flush  5-40 mL IntraVENous 2 times per day    enoxaparin  40 mg SubCUTAneous QPM    guaiFENesin  600 mg Oral BID      sodium chloride 20 mL/hr at 12/09/22 0932     albuterol sulfate HFA, ipratropium, sodium chloride flush, sodium chloride, ondansetron **OR** ondansetron, acetaminophen **OR** acetaminophen, guaiFENesin-dextromethorphan, benzonatate    Lab Data:  CBC: No results for input(s): WBC, HGB, HCT, MCV, PLT in the last 72 hours. BMP: No results for input(s): NA, K, CL, CO2, PHOS, BUN, CREATININE, CA in the last 72 hours. LIVER PROFILE: No results for input(s): AST, ALT, LIPASE, BILIDIR, BILITOT, ALKPHOS in the last 72 hours. Invalid input(s): AMYLASE,  ALB  PT/INR: No results for input(s): PROTIME, INR in the last 72 hours. APTT: No results for input(s): APTT in the last 72 hours.   BNP:  No results for input(s): BNP in the last 72 hours. TROPONIN: No results for input(s): TROPONINI in the last 72 hours. No results for input(s): TROPONINT in the last 72 hours. Labs, consult, tests reviewed                    Deja Pittman MD, PA-C 12/9/2022 3:29 PM     Please note this report has been partially produced using speech recognition software and may contain errors related to that system including errors in grammar, punctuation, and spelling, as well as words and phrases that may be inappropriate. If there are any questions or concerns please feel free to contact the dictating provider for clarification.

## 2022-12-09 NOTE — CARE COORDINATION
[address] [facility] Liaison spoke w/pt & is aware of discharge & will initiate Drew Dumont. Address, number & pcp verified.

## 2022-12-09 NOTE — PROGRESS NOTES
12/9/2022 2:24 PM  Patient Room #: 2017/2017-A  Patient Name: John Chinchilla    (Step 1 Done by RN if possible otherwise call Pulmonary Diagnostics)  Place patient on room air at rest for at least 30 minutes. If patient falls below 88% before 30 minutes then you can record the level and stop. Record room air saturation level 86_ %. If patient is at 88% or below, they will qualify for home oxygen and you can stop. If level does not fall below 88%, fill in level above. If indicated continue to Step 2. Signature:_Elvira Hardy, RRT__ Date: _12/09/2022__  (Step 2&3 Done by P)  Ambulate patient on room air until saturation falls below 89%. Record level of room air saturation with ambulation___ %. Next, place patient back on ___lpm oxygen and ambulate, record level __%. (Note:  this level must show improvement from room air level done with ambulation.)  If patients saturation on room air with ambulation is 88% or below AND patient shows improvement with oxygen during ambulation, they will qualify for home oxygen and you can stop. If patient does not drop below 89%, then patient should have an overnight oximetry trending on room air to see if level falls below 88%. Complete level in Step 3 below. Room air overnight oximetry level 88 % for___  cumulative minutes. If patients room air oxygen level is < 89% for at least 5 cumulative minutes, patient will qualify for home oxygen and you can stop. (Attach Night Trending Report)    Complete order below: Diagnosis: parainfluenza infection__  Home oxygen at:  Length of Need: ? Lifetime   ? X 3 Months     _2__lpm or __%   via  [x] nasal cannula  []mask  [] other         [x]continuous [x]  with activity  [x]  Nocturnal   [x] Portable Tanks [x]  Concentrator  [] Conserving Device        Therapist Signature:_Elvira Hardy, RRT_     Date:  _12/09/2022__  Physician Signature:  __Electronically Signed in EMR_    Date:___  Physician Printed Name:  Mavis Black MD  NPI:  [id number]    [x] Patient Qualifies      [] Patient Does NOT qualify

## 2022-12-09 NOTE — PROGRESS NOTES
Outpatient Pharmacy Progress Note for Meds-to-Beds    Total number of Prescriptions Filled: 5  The following medications were dispensed to the patient during the discharge process:  Albuterol 2.5mg/3ml for nebulizer  Azithromycin 500mg  Losartan 25mg  Pantoprazole 40mg  Aspirin 81mg chew    Additional Documentation:  Patient picked-up the medication(s) in the OP Pharmacy      Thank you for letting us serve your patients.   [address] [facility]    [address] [facility]    Phone: [phone]    Fax: [phone]

## 2022-12-09 NOTE — DISCHARGE SUMMARY
V2.0  Discharge Summary    Name:  Rhoda Tse /Age/Sex: 1950 (17 y.o. female)   Admit Date: 2022  Discharge Date: 22    MRN & CSN:  [id number] & [id number] Encounter Date and Time 22 12:59 PM EST    Attending:  Oul Turner MD Discharging Provider: Olu Turner MD, MD       Hospital Course:     Brief HPI: Rhoda Tse is a 67 y.o. female with pmh of anemia, hypertension who presents with Community acquired pneumonia due to Chlamydia species        Plan:  Severe sepsis and acute hypoxic respiratory failure in the setting of parainfluenza infection: Presented with shortness of breath productive cough for 5 days. Was febrile at 103 °F.  Lactic acid level was 2.0. Chest x-ray showed left basilar consolidation. CT PE was negative. COVID test was negative. Positive for parainfluenza. On Levaquin. Change Levaquin to ceftriaxone and azithromycin for atypical coverage. De-escalate oxygen requirements. Currently on 2 L nasal cannula. White count is improving. Discontinue IV fluids. Will order home health care. Plan for DC today with home oxygen and HHC. Elevated troponins rule out ACS? Cardiology on board. Echocardiogram showed grade 1 diastolic heart failure with preserved ejection fraction. Cardiology has a plan for Lexiscan that showed moderate sized defect of the moderate severity which is reversible involving the anterior wall of the myocardium. Cardio recommends cath outpatient. Please follow up with cardiology on outpatient basis. Acute on chronic anemia with hemoglobin 9.3 in the ED. 14 in 2022. On Protonix. Based on iron studies look like anemia of chronic disease. Essential hypertension    The patient expressed appropriate understanding of, and agreement with the discharge recommendations, medications, and plan.      Consults this admission:  IP CONSULT TO CARDIOLOGY  IP CONSULT TO HOME CARE NEEDS    Discharge Diagnosis:   Community acquired pneumonia due to Chlamydia species        Discharge Instruction:   Follow up appointments: PCP, Card   Primary care physician: Gm Ramos DO within 2 weeks  Diet: cardiac diet   Activity: activity as tolerated  Disposition: Discharged to:   []Home, [x]HHC, []SNF, []Acute Rehab, []Hospice   Condition on discharge: Stable  Labs and Tests to be Followed up as an outpatient by PCP or Specialist:     Discharge Medications:        Medication List        START taking these medications      albuterol (2.5 MG/3ML) 0.083% nebulizer solution  Commonly known as: PROVENTIL  Take 3 mLs by nebulization 4 times daily     aspirin 81 MG chewable tablet  Take 1 tablet by mouth daily  Start taking on: December 10, 2022     azithromycin 500 MG tablet  Commonly known as: ZITHROMAX  Take 1 tablet by mouth daily for 1 dose  Start taking on: December 10, 2022     losartan 25 MG tablet  Commonly known as: COZAAR  Take 1 tablet by mouth daily  Start taking on: December 10, 2022     OXYGEN  Inhale 2 L into the lungs continuous     pantoprazole 40 MG tablet  Commonly known as: PROTONIX  Take 1 tablet by mouth every morning (before breakfast)  Start taking on: December 10, 2022               Where to Get Your Medications        These medications were sent to [address] [place] [address]      Phone: [phone]   albuterol (2.5 MG/3ML) 0.083% nebulizer solution  aspirin 81 MG chewable tablet  azithromycin 500 MG tablet  losartan 25 MG tablet  pantoprazole 40 MG tablet       You can get these medications from any pharmacy    Bring a paper prescription for each of these medications  OXYGEN        Objective Findings at Discharge:   BP (!) 177/68   Pulse 68   Temp 99.2 °F (37.3 °C) (Oral)   Resp 24   Ht 5' 1\" (1.549 m)   Wt 182 lb 1.6 oz (82.6 kg)   SpO2 90%   BMI 34.41 kg/m²       Physical Exam:   Physical Exam  Vitals reviewed. Constitutional:       Appearance: Normal appearance. She is normal weight. HENT:      Head: Normocephalic. Nose: Nose normal.      Mouth/Throat:      Mouth: Mucous membranes are moist.   Eyes:      Conjunctiva/sclera: Conjunctivae normal.      Pupils: Pupils are equal, round, and reactive to light. Cardiovascular:      Rate and Rhythm: Normal rate and regular rhythm. Pulses: Normal pulses. Heart sounds: Normal heart sounds. No murmur heard. Pulmonary:      Effort: Respiratory distress present. Breath sounds: Rales present. No wheezing or rhonchi. Abdominal:      General: Abdomen is flat. Bowel sounds are normal. There is no distension. Palpations: Abdomen is soft. Tenderness: no abdominal tenderness   Musculoskeletal:         General: No deformity. Normal range of motion. Cervical back: Normal range of motion and neck supple. Right lower leg: No edema. Left lower leg: No edema. Skin:     Coloration: Skin is not jaundiced or pale. Neurological:      General: No focal deficit present. Mental Status: She is alert and oriented to person, place, and time. Mental status is at baseline. Labs and Imaging   No results found. CBC: No results for input(s): WBC, HGB, PLT in the last 72 hours. BMP:  No results for input(s): NA, K, CL, CO2, BUN, CREATININE, GLUCOSE in the last 72 hours. Hepatic: No results for input(s): AST, ALT, ALB, BILITOT, ALKPHOS in the last 72 hours. Lipids: No results found for: CHOL, HDL, TRIG  Hemoglobin A1C: No results found for: LABA1C  TSH: No results found for: TSH  Troponin:   Lab Results   Component Value Date/Time    TROPONINT 0.131 12/05/2022 07:43 AM     Lactic Acid: No results for input(s): LACTA in the last 72 hours. BNP: No results for input(s): PROBNP in the last 72 hours.   UA:  Lab Results   Component Value Date/Time    NITRU NEGATIVE 12/05/2022 09:40 AM    COLORU YELLOW 12/05/2022 09:40 AM    WBCUA 1 12/05/2022 09:40 AM    RBCUA 2 12/05/2022 09:40 AM    MUCUS RARE 12/05/2022 09:40 AM    TRICHOMONAS NONE SEEN 12/05/2022 09:40 AM    BACTERIA NEGATIVE 12/05/2022 09:40 AM    CLARITYU CLEAR 12/05/2022 09:40 AM    SPECGRAV 1.020 12/05/2022 09:40 AM    LEUKOCYTESUR NEGATIVE 12/05/2022 09:40 AM    UROBILINOGEN 0.2 12/05/2022 09:40 AM    BILIRUBINUR NEGATIVE 12/05/2022 09:40 AM    BLOODU SMALL NUMBER OR AMOUNT OBSERVED 12/05/2022 09:40 AM    KETUA NEGATIVE 12/05/2022 09:40 AM     Urine Cultures: No results found for: LABURIN  Blood Cultures: No results found for: BC  No results found for: BLOODCULT2  Organism: No results found for: ORG    Time Spent Discharging patient 35 minutes    Electronically signed by Lance Mitchell MD, MD on 12/9/2022 at 1:03 PM

## 2022-12-10 LAB
CULTURE: NORMAL
CULTURE: NORMAL
Lab: NORMAL
Lab: NORMAL
SPECIMEN: NORMAL
SPECIMEN: NORMAL

## 2022-12-13 ENCOUNTER — TELEPHONE (OUTPATIENT)
Dept: CARDIOLOGY CLINIC | Age: 72
End: 2022-12-13

## 2022-12-28 NOTE — CARE COORDINATION
Pt made non admit with [facility] as he has refused hhc. Called pt and left VM with number in the event he changes his mind.